# Patient Record
Sex: FEMALE | Race: WHITE | Employment: OTHER | URBAN - METROPOLITAN AREA
[De-identification: names, ages, dates, MRNs, and addresses within clinical notes are randomized per-mention and may not be internally consistent; named-entity substitution may affect disease eponyms.]

---

## 2017-07-21 PROBLEM — G45.1 TIA INVOLVING RIGHT INTERNAL CAROTID ARTERY: Status: ACTIVE | Noted: 2017-07-21

## 2017-11-01 PROBLEM — N18.30 CHRONIC RENAL IMPAIRMENT, STAGE 3 (MODERATE) (HCC): Status: ACTIVE | Noted: 2017-11-01

## 2017-12-01 PROBLEM — K85.90 PANCREATITIS, UNSPECIFIED PANCREATITIS TYPE: Status: ACTIVE | Noted: 2017-12-01

## 2017-12-07 PROBLEM — K81.9 CHOLECYSTITIS: Status: ACTIVE | Noted: 2017-12-07

## 2020-01-01 ENCOUNTER — TELEPHONE (OUTPATIENT)
Dept: PRIMARY CARE CLINIC | Age: 85
End: 2020-01-01

## 2020-01-01 ENCOUNTER — OFFICE VISIT (OUTPATIENT)
Dept: PRIMARY CARE CLINIC | Age: 85
End: 2020-01-01
Payer: MEDICARE

## 2020-01-01 VITALS
BODY MASS INDEX: 24.03 KG/M2 | OXYGEN SATURATION: 94 % | WEIGHT: 135.6 LBS | RESPIRATION RATE: 20 BRPM | DIASTOLIC BLOOD PRESSURE: 66 MMHG | SYSTOLIC BLOOD PRESSURE: 130 MMHG | HEIGHT: 63 IN | HEART RATE: 71 BPM | TEMPERATURE: 98.8 F

## 2020-01-01 VITALS
BODY MASS INDEX: 24.03 KG/M2 | RESPIRATION RATE: 20 BRPM | HEIGHT: 63 IN | SYSTOLIC BLOOD PRESSURE: 112 MMHG | WEIGHT: 135.6 LBS | DIASTOLIC BLOOD PRESSURE: 49 MMHG | TEMPERATURE: 99.5 F | HEART RATE: 61 BPM | OXYGEN SATURATION: 98 %

## 2020-01-01 VITALS
SYSTOLIC BLOOD PRESSURE: 126 MMHG | RESPIRATION RATE: 18 BRPM | OXYGEN SATURATION: 95 % | HEART RATE: 57 BPM | DIASTOLIC BLOOD PRESSURE: 68 MMHG | TEMPERATURE: 97 F

## 2020-01-01 VITALS
HEIGHT: 63 IN | SYSTOLIC BLOOD PRESSURE: 126 MMHG | OXYGEN SATURATION: 99 % | TEMPERATURE: 97 F | RESPIRATION RATE: 20 BRPM | BODY MASS INDEX: 24.27 KG/M2 | HEART RATE: 58 BPM | DIASTOLIC BLOOD PRESSURE: 68 MMHG

## 2020-01-01 VITALS
HEIGHT: 63 IN | DIASTOLIC BLOOD PRESSURE: 78 MMHG | WEIGHT: 137 LBS | SYSTOLIC BLOOD PRESSURE: 118 MMHG | HEART RATE: 55 BPM | OXYGEN SATURATION: 95 % | RESPIRATION RATE: 20 BRPM | BODY MASS INDEX: 24.27 KG/M2 | TEMPERATURE: 97.3 F

## 2020-01-01 VITALS
SYSTOLIC BLOOD PRESSURE: 126 MMHG | HEART RATE: 72 BPM | BODY MASS INDEX: 24.95 KG/M2 | TEMPERATURE: 97 F | HEIGHT: 63 IN | DIASTOLIC BLOOD PRESSURE: 68 MMHG | OXYGEN SATURATION: 97 % | RESPIRATION RATE: 20 BRPM | WEIGHT: 140.8 LBS

## 2020-01-01 LAB
ABSOLUTE BASO #: 0.1 10*3/UL (ref 0–0.1)
ABSOLUTE EOS #: 0.3 10*3/UL (ref 0–0.4)
ABSOLUTE NEUT #: 5.1 10*3/UL (ref 2.3–7.9)
ALBUMIN: 3.5 GM/DL (ref 3.1–4.5)
ALP BLD-CCNC: 120 U/L (ref 45–117)
ALT SERPL-CCNC: 19 U/L (ref 12–78)
AST SERPL-CCNC: 7 IU/L (ref 3–35)
BACTERIA, URINE: ABNORMAL
BASOPHILS %: 0.8 % (ref 0–1)
BILIRUB SERPL-MCNC: 0.5 MG/DL (ref 0.2–1)
BILIRUBIN: NEGATIVE
BLOOD: NEGATIVE
BUN BLDV-MCNC: 18 MG/DL (ref 7–24)
BUN BLDV-MCNC: 19 MG/DL (ref 7–24)
CALCIUM SERPL-MCNC: 8.7 MG/DL (ref 8.5–10.5)
CALCIUM SERPL-MCNC: 8.9 MG/DL (ref 8.5–10.5)
CHLORIDE BLD-SCNC: 92 MMOL/L (ref 98–107)
CHLORIDE BLD-SCNC: 96 MMOL/L (ref 98–107)
CLARITY: ABNORMAL
CLARITY: ABNORMAL
CLARITY: CLEAR
CO2: 30 MMOL/L (ref 21–32)
CO2: 32 MMOL/L (ref 21–32)
COLOR: YELLOW
CREAT SERPL-MCNC: 0.79 MG/DL (ref 0.55–1.02)
CREAT SERPL-MCNC: 0.9 MG/DL (ref 0.55–1.02)
EOSINOPHILS %: 4.5 % (ref 1–4)
EPITHELIAL CELLS, UA: ABNORMAL
GFR AFRICAN AMERICAN: > 60 ML/MIN
GFR AFRICAN AMERICAN: > 60 ML/MIN
GFR SERPL CREATININE-BSD FRML MDRD: 58 ML/MIN/
GFR SERPL CREATININE-BSD FRML MDRD: >60 ML/MIN/
GLUCOSE: 92 MG/DL (ref 65–99)
GLUCOSE: 94 MG/DL (ref 65–99)
GLUCOSE: NEGATIVE
HCT VFR BLD CALC: 34.6 % (ref 37–47)
HEMOGLOBIN: 11.8 G/DL (ref 12–16)
IMMATURE GRANULOCYTES #: 0 10*3/UL (ref 0–0.1)
IMMATURE GRANULOCYTES: 0.4 % (ref 0–1)
KETONES: ABNORMAL
KETONES: NEGATIVE
KETONES: NEGATIVE
LEUKOCYTE ESTERASE, URINE: ABNORMAL
LYMPHOCYTE %: 18.2 % (ref 27–41)
LYMPHOCYTES # BLD: 1.3 10*3/UL (ref 1.3–4.4)
MCH RBC QN AUTO: 31.1 PG (ref 27–31)
MCHC RBC AUTO-ENTMCNC: 34.1 G/DL (ref 33–37)
MCV RBC AUTO: 91.1 FL (ref 81–99)
MONOCYTES # BLD: 0.5 10*3/UL (ref 0.1–1)
MONOCYTES %: 6.4 % (ref 3–9)
NEUTROPHILS %: 69.7 % (ref 47–73)
NITRITE, URINE: NEGATIVE
NUCLEATED RED BLOOD CELLS: 0 % (ref 0–0)
PDW BLD-RTO: 12.5 % (ref 0–14.5)
PH, URINE: 5 (ref 4.5–8)
PH, URINE: 7 (ref 5–9)
PH, URINE: 7.5 (ref 4.5–8)
PLATELET # BLD: 263 10*3/UL (ref 130–400)
PMV BLD AUTO: 9.9 FL (ref 9.6–12.3)
POTASSIUM SERPL-SCNC: 3.3 MMOL/L (ref 3.5–5.1)
POTASSIUM SERPL-SCNC: 3.7 MMOL/L (ref 3.5–5.1)
PROTEIN UA: ABNORMAL
RBC # BLD: 3.8 10*6/UL (ref 4.1–5.1)
SODIUM BLD-SCNC: 127 MMOL/L (ref 136–145)
SODIUM BLD-SCNC: 132 MMOL/L (ref 136–145)
SPECIFIC GRAVITY UA: 1.01 (ref 1–1.03)
TOTAL PROTEIN: 6.5 GM/DL (ref 6.4–8.2)
TSH SERPL DL<=0.05 MIU/L-ACNC: 1.34 UIU/ML (ref 0.36–4.75)
URINE CULTURE, ROUTINE: ABNORMAL
URINE CULTURE, ROUTINE: NORMAL
URINE CULTURE, ROUTINE: NORMAL
UROBILINOGEN, URINE: 1 E.U./DL (ref 0.2–1)
UROBILINOGEN, URINE: 1 E.U./DL (ref 0–1)
UROBILINOGEN, URINE: 1 E.U./DL (ref 0–1)
WBC # BLD: 7.4 10*3/UL (ref 4.8–10.8)
WBC URINE: ABNORMAL WBC/HPF (ref 0–5)

## 2020-01-01 PROCEDURE — G0008 ADMIN INFLUENZA VIRUS VAC: HCPCS | Performed by: FAMILY MEDICINE

## 2020-01-01 PROCEDURE — 69210 REMOVE IMPACTED EAR WAX UNI: CPT | Performed by: FAMILY MEDICINE

## 2020-01-01 PROCEDURE — 90694 VACC AIIV4 NO PRSRV 0.5ML IM: CPT | Performed by: FAMILY MEDICINE

## 2020-01-01 RX ORDER — AMIODARONE HYDROCHLORIDE 200 MG/1
TABLET ORAL
Qty: 31 TABLET | Refills: 10 | Status: SHIPPED | OUTPATIENT
Start: 2020-01-01

## 2020-01-01 RX ORDER — ANASTROZOLE 1 MG/1
TABLET ORAL
Qty: 31 TABLET | Refills: 11 | Status: SHIPPED | OUTPATIENT
Start: 2020-01-01

## 2020-01-01 RX ORDER — SIMETHICONE 80 MG
80 TABLET,CHEWABLE ORAL EVERY 8 HOURS PRN
COMMUNITY
Start: 2018-12-27

## 2020-01-01 RX ORDER — CIPROFLOXACIN 250 MG/1
250 TABLET, FILM COATED ORAL 2 TIMES DAILY
Qty: 6 TABLET | Refills: 0 | Status: SHIPPED | OUTPATIENT
Start: 2020-01-01 | End: 2020-01-01

## 2020-01-01 RX ORDER — BISACODYL 10 MG
10 SUPPOSITORY, RECTAL RECTAL DAILY PRN
COMMUNITY
Start: 2020-02-21

## 2020-01-01 RX ORDER — POLYVINYL ALCOHOL 14 MG/ML
1 SOLUTION/ DROPS OPHTHALMIC 3 TIMES DAILY PRN
COMMUNITY
Start: 2015-08-14

## 2020-01-01 RX ORDER — AMLODIPINE BESYLATE 5 MG/1
TABLET ORAL
Qty: 31 TABLET | Refills: 10 | Status: ON HOLD
Start: 2020-01-01 | End: 2021-01-01 | Stop reason: SDUPTHER

## 2020-01-01 RX ORDER — AMOXICILLIN 250 MG
1 CAPSULE ORAL 2 TIMES DAILY
COMMUNITY
Start: 2020-05-12

## 2020-01-01 RX ORDER — OXYBUTYNIN CHLORIDE 5 MG/1
TABLET ORAL
Qty: 62 TABLET | Refills: 10 | Status: SHIPPED
Start: 2020-01-01 | End: 2020-01-01

## 2020-01-01 RX ORDER — HYDROCHLOROTHIAZIDE 25 MG/1
TABLET ORAL
Qty: 31 TABLET | Refills: 10 | Status: SHIPPED
Start: 2020-01-01 | End: 2021-01-01 | Stop reason: ALTCHOICE

## 2020-01-01 RX ORDER — SIMVASTATIN 20 MG
TABLET ORAL
Qty: 31 TABLET | Refills: 10 | Status: ON HOLD
Start: 2020-01-01 | End: 2021-01-01 | Stop reason: HOSPADM

## 2020-01-01 RX ORDER — TRAMADOL HYDROCHLORIDE 50 MG/1
50 TABLET ORAL 2 TIMES DAILY
COMMUNITY
End: 2020-01-01 | Stop reason: SDUPTHER

## 2020-01-01 RX ORDER — ANORECTAL OINTMENT 15.7; .44; 24; 20.6 G/100G; G/100G; G/100G; G/100G
1 OINTMENT TOPICAL 2 TIMES DAILY PRN
COMMUNITY
Start: 2019-07-22

## 2020-01-01 RX ORDER — LOSARTAN POTASSIUM 100 MG/1
TABLET ORAL
Qty: 31 TABLET | Refills: 11 | Status: ON HOLD
Start: 2020-01-01 | End: 2021-01-01 | Stop reason: HOSPADM

## 2020-01-01 RX ORDER — LORATADINE 10 MG/1
10 TABLET ORAL DAILY PRN
COMMUNITY
Start: 2018-05-29

## 2020-01-01 RX ORDER — CIPROFLOXACIN 250 MG/1
250 TABLET, FILM COATED ORAL 2 TIMES DAILY
Qty: 14 TABLET | Refills: 0 | Status: SHIPPED | OUTPATIENT
Start: 2020-01-01 | End: 2020-01-01

## 2020-01-01 RX ORDER — TRAMADOL HYDROCHLORIDE 50 MG/1
50 TABLET ORAL 2 TIMES DAILY
Qty: 60 TABLET | Refills: 5 | Status: SHIPPED
Start: 2020-01-01 | End: 2021-01-01 | Stop reason: SDUPTHER

## 2020-01-01 RX ORDER — CARBOXYMETHYLCELLULOSE SODIUM 5 MG/ML
1 SOLUTION/ DROPS OPHTHALMIC 3 TIMES DAILY
COMMUNITY
Start: 2020-03-27

## 2020-01-01 RX ORDER — A/SINGAPORE/GP1908/2015 IVR-180 (AN A/MICHIGAN/45/2015 (H1N1)PDM09-LIKE VIRUS, A/HONG KONG/4801/2014, NYMC X-263B (H3N2) (AN A/HONG KONG/4801/2014-LIKE VIRUS), AND B/BRISBANE/60/2008, WILD TYPE (A B/BRISBANE/60/2008-LIKE VIRUS) 15; 15; 15 UG/.5ML; UG/.5ML; UG/.5ML
0.5 INJECTION, SUSPENSION INTRAMUSCULAR ONCE
COMMUNITY
Start: 2020-01-01 | End: 2020-01-01

## 2020-01-01 RX ORDER — CLOPIDOGREL BISULFATE 75 MG/1
75 TABLET ORAL DAILY
Qty: 31 TABLET | Refills: 11 | Status: ON HOLD
Start: 2020-01-01 | End: 2021-01-01 | Stop reason: HOSPADM

## 2020-01-01 ASSESSMENT — PATIENT HEALTH QUESTIONNAIRE - PHQ9
SUM OF ALL RESPONSES TO PHQ9 QUESTIONS 1 & 2: 0
SUM OF ALL RESPONSES TO PHQ QUESTIONS 1-9: 0
2. FEELING DOWN, DEPRESSED OR HOPELESS: 0
1. LITTLE INTEREST OR PLEASURE IN DOING THINGS: 0
SUM OF ALL RESPONSES TO PHQ QUESTIONS 1-9: 0

## 2020-08-04 PROBLEM — K85.90 PANCREATITIS, UNSPECIFIED PANCREATITIS TYPE: Status: RESOLVED | Noted: 2017-12-01 | Resolved: 2020-01-01

## 2020-08-04 PROBLEM — K81.9 CHOLECYSTITIS: Status: RESOLVED | Noted: 2017-12-07 | Resolved: 2020-01-01

## 2020-08-04 NOTE — PROGRESS NOTES
2020    Home visit medically necessary in lieu of an office visit due to: [facility] resident, unable to walk, uses wheelchair, difficult to get out. HPI:  Jessica Landis (:  1924) is a 80 y.o. 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  Patient has lived at [address] [place] [address] for over 5 years. She was admitted to [facility] in  for a subdural hematoma, requiring a moreno hole. She had been on coumadin. She says she has been doing okay since then except for OAB. She feels she is better since being on oxybutynin. She also has problems with constipation but currently is doing well using Miralax. She is unable to walk due to weakness and uses a WC to get around. She takes tramadol twice a day for chronic DJD pain. Hearing is decreased. REVIEW OF SYSTEMS:    GENERAL: Appetite fair-good. No weight change, generally healthy, DECLINING strength AND exercise tolerance. SKIN:  No rash, itching, lesions, ecchymosis, erythema or ulcers. HEAD: No headaches, no lightheadedness, no injury. EYES: Poor vision, no diplopia, no tearing, no blind spots, no pain. EARS: HARD OF hearing, no tinnitus, no bleeding, no vertigo. NOSE: No epistaxis, no coryza, no obstruction, no discharge. MOUTH: No dental difficulties, no gingival bleeding, FULL dentures. NECK: No stiffness, no pain, no tenderness, no noted masses. CARDIOVASCULAR: No chest pains, no murmurs, no palpitations, no syncope, no orthopnea. SWELLING OF LEGS. RESPIRATORY: No shortness of breath, no pain with breathing, no wheezing, no hemoptysis, no cough, no respiratory infections, no TB, no fevers or night sweats. BREASTS:  No lumps, discharge or pain. GASTROINTESTINAL: No change in appetite, no dysphagia, no heartburn, no abdominal pains, no diarrhea, no bowel habit changes, no emesis, no melena, no hemorrhoids. STRUGGLES WITH CONSTIPATION. GENITOURINARY: No incontinence or retention, no nocturia, no frequent UTIs, no dysuria, no change in nature of urine. OAB, URGENCY. (Female) No post-menopausal bleeding, no discharge, no itching. MUSCULOSKELETAL: No swelling or redness of joints, no limitation of range of motion, no numbness. JOINT & MUSCLE PAIN AND WEAKNESS. NEURO/PSYCH: No tremor, no seizures, no changes in mentation, no ataxia. No depressive symptoms, no changes in sleep habits, no changes in thought content. MEMORY LOSS, CONFUSION. ALLERGIC/IMMUNOLOGIC/ENDOCRINE:  No reactions to drugs, foods or  insects. No anemia, no bleeding tendency, no transfusions.     Allergies   Allergen Reactions    Lopid [Gemfibrozil] Other (See Comments)     Leg weakness     Past Medical History:   Diagnosis Date    Arthritis     Atrial fibrillation ([address]     Chronic renal impairment, stage 3 (moderate) ([address] 11/1/2017    [place] (hard of hearing)     Hyperlipidemia     Hypertension     Irregular heartbeat     Malignant neoplasm of left female breast ([address] 1/11/2016    Osteoarthritis     Unspecified cerebral artery occlusion with cerebral infarction 2004    Valvular heart disease 4/28/2015    Visual disorder     double vision  (patient states she thinks it's her left eye)     Past Surgical History:   Procedure Laterality Date    BLADDER REPAIR      BREAST BIOPSY Right 1992    JOVI HOLE  6/16/15    CARDIOVERSION  11/24/2014    Dr. Magy Gold COLONOSCOPY  2004    EYE SURGERY Left 2011    cataract    HYSTERECTOMY  1994     Social History     Socioeconomic History    Marital status:     Number of children: 2 daughters, 1 son    Years of education: Not on file   Occupational History    Cook   Tobacco Use    Smoking status: Never Smoker    Smokeless tobacco: Never Used   Substance and Sexual Activity    Alcohol use: No    Drug use: No    Sexual activity: Not Currently   Lifestyle     Family History   Problem Relation Age of Onset    High Blood Pressure Mother     Cancer Mother 80        lung    Cancer Sister 80        colon     PHYSICAL EXAM:   Vitals:    08/04/20 0905   BP: (!) 112/49   Site: Right Wrist   Position: Sitting   Pulse: 61   Resp: 20   Temp: 99.5 °F (37.5 °C)   SpO2: 98%   Weight: 135 lb 9.6 oz (61.5 kg)   Height: 5' 3\" (1.6 m)     Estimated body mass index is 24.02 kg/m² as calculated from the following:    Height as of this encounter: 5' 3\" (1.6 m). Weight as of this encounter: 135 lb 9.6 oz (61.5 kg). GEN:  elderly WDWN female patient seated in recliner chair in NAD. HEAD:  atraumatic, normocephalic. EYES: EOMI, PERRL, no cataracts, conjunctivae appear normal.  ENT:   Poor hearing, bilateral EACs with cerumen impactions, TM's obscurred, nasal septum midline, no significant congestion, oral cavity without lesions, edentulous, full dentures. NECK:  fair-good ROM, no palpable masses, no carotid bruits, no JVD. LUNGS:  clear to ausc, no rales, rhonchi or wheezes. HEART:  RRR, no murmurs or gallops. ABD:  soft, non-tender to palp, no palp masses or HSM, normal BS. BACK:  no scoliosis or kyphosis, non-tender to palp. EXTREMITIES:  1+ pretibial edema, no ulcerations, varicosities or erythema. No gross deformities. MUSCULOSKELETAL:  Fair-good ROM of all joints, non tender to palp and or with movement. SKIN: No ulcerations or breakdown, rash, ecchymosis, or other lesions.  NEURO: No tremor, motor UEs 4-5/5 LEs 3-4/5, sensory normal, unable to stand or walk. PSYCH:  Pleasant and cooperative. Fluid slow speech, oriented to person, place and partial time. No delusional statements. Medications reviewed. 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     Diagnoses attached to this encounter:  Bethany Phillips was seen today for hypertension, atrial fibrillation, extremity weakness, constipation, incontinence and ear problem. Diagnoses and all orders for this visit:    Essential hypertension    Chronic renal impairment, stage 3 (moderate) ([facility])    Primary osteoarthritis involving multiple joints    History of moreno hole surgery 6/16/15    Malignant neoplasm of upper-outer quadrant of left female breast, unspecified estrogen receptor status (HCC)    OAB (overactive bladder)    Other constipation    Unable to walk    Senile debility    Bilateral hearing loss due to cerumen impaction       PLAN:  Check labs. Continue tramadol BID and as needed for pain. Cleaned bilateral ear wax impaction using [place] earloop. Tolerated well without pain. TM appears normal. Fall prevention discussed. Continue other meds as per Rx List. Recheck 1 month. 60 minutes spent on visit, 35 minutes involved education/counseling regarding cardiac, urinary, GI and ENT disease processes, treatment options, meds and coordination of care.      Current Outpatient Medications   Medication Sig Dispense Refill    17GRAM (1 CAPFUL) IN 4 TO 8OZ OF BEVERAGE AND TAKE BY MOUTH DAILY.) 116 g 1    metoprolol tartrate (LOPRESSOR) 25 MG tablet TAKE 1 TABLET BY MOUTH ONCE DAILY (Patient taking differently: Take 25 mg by mouth daily ) 90 tablet 1    Artificial Tear Ointment (REFRESH LACRI-LUBE) OINT ointment Apply 1 inch to eye nightly as needed       magnesium oxide (MAG-OX) 400 (240 MG) MG tablet take 1 tablet by mouth once daily (Patient taking differently: Take 400 mg by mouth daily take 1 tablet by mouth once daily) 30 tablet 3    docusate sodium (COLACE) 100 MG capsule Take 200 mg by mouth daily       calcium citrate-vitamin D (CITRICAL + D) 315-250 MG-UNIT TABS per tablet Take 2 tablets by mouth daily       aspirin 81 MG tablet Take 81 mg by mouth daily      magnesium hydroxide (MILK OF MAGNESIA) 400 MG/5ML suspension Take 30 mLs by mouth daily as needed for Constipation      acetaminophen (TYLENOL) 325 MG tablet Take 650 mg by mouth every 4 hours as needed for Pain or Fever       loperamide (IMODIUM) 2 MG capsule Take 2 mg by mouth 4 times daily as needed for Diarrhea       No current facility-administered medications for this visit. Return in about 1 month (around 9/4/2020). An  electronic signature was used to authenticate this note.     --Pauline Holguin MD on 8/4/2020 at 4:57 PM

## 2020-09-01 NOTE — PROGRESS NOTES
9/1/2020    Home visit medically necessary in lieu of an office visit due to: [facility] resident, unable to walk, uses wheelchair, difficult to get out. HPI:   Patient says she is doing better but thinks she has another UTI. She took a 3 day course of cipro about 2 weeks ago. She continues to have OAB but no dysuria. She has nocturia x 2. She is moving her bowels well using Miralax. She is unable to walk due to weakness and uses a WC to get around. She takes tramadol twice a day for chronic DJD pain and is not c/o pain. REVIEW OF SYSTEMS:    GENERAL: Appetite fair-good. No weight change, generally healthy, DECLINING strength AND exercise tolerance. CARDIOVASCULAR: No chest pains, no murmurs, no palpitations, no syncope, no orthopnea. SWELLING OF LEGS. RESPIRATORY: No shortness of breath, no pain with breathing, no wheezing, no hemoptysis, no cough. GASTROINTESTINAL: No change in appetite, no dysphagia, no heartburn, no abdominal pains, no diarrhea, no bowel habit changes, no emesis, no melena, no hemorrhoids. STRUGGLES WITH CONSTIPATION. GENITOURINARY: No incontinence or retention, no nocturia, no frequent UTIs, no dysuria, no change in nature of urine. OAB, URGENCY. MUSCULOSKELETAL: No swelling or redness of joints, no limitation of range of motion, no numbness. JOINT & MUSCLE PAIN AND WEAKNESS. NEURO/PSYCH: No tremor, no seizures, no changes in mentation, no ataxia. No depressive symptoms, no changes in sleep habits, no changes in thought content. MEMORY LOSS, CONFUSION. All other systems negative.     Allergies   Allergen Reactions    Lopid [Gemfibrozil] Other (See Comments)     Leg weakness     Past Medical History:   Diagnosis Date    Arthritis     Atrial fibrillation ([address]     Chronic renal impairment, stage 3 (moderate) ([address] 11/1/2017    [place] (hard of hearing)     Hyperlipidemia     Hypertension     Irregular heartbeat     Malignant neoplasm of left female breast ([address] 1/11/2016    Osteoarthritis     Unspecified cerebral artery occlusion with cerebral infarction 2004    Valvular heart disease 4/28/2015    Visual disorder     double vision  (patient states she thinks it's her left eye)     Past Surgical History:   Procedure Laterality Date    BLADDER REPAIR      BREAST BIOPSY Right 1992    JOVI HOLE  6/16/15    CARDIOVERSION  11/24/2014    Dr. Tarun Theodore  2004    EYE SURGERY Left 2011    cataract   [id number]     Social History     Socioeconomic History    Marital status:     Number of children: 2 daughters, 1 son    Years of education: Not on file   Occupational History    Cook   Tobacco Use    Smoking status: Never Smoker    Smokeless tobacco: Never Used   Substance and Sexual Activity    Alcohol use: No    Drug use: No    Sexual activity: Not Currently   Lifestyle     Family History   Problem Relation Age of Onset    High Blood Pressure Mother     Cancer Mother 80        lung    Cancer Sister 80        colon     PHYSICAL EXAM:   Vitals:    09/01/20 1116   BP: 130/66   Site: Left Wrist   Position: Sitting   Pulse: 71   Resp: 20   Temp: 98.8 °F (37.1 °C)   TempSrc: Infrared   SpO2: 94%   Weight: 135 lb 9.6 oz (61.5 kg)   Height: 5' 3\" (1.6 m)     Estimated body mass index is 24.02 kg/m² as calculated from the following:    Height as of this encounter: 5' 3\" (1.6 m). Weight as of this encounter: 135 lb 9.6 oz (61.5 kg). GEN:  elderly WDWN female patient seated in recliner chair in NAD. HEAD:  atraumatic, normocephalic. EYES: EOMI, PERRL, no cataracts, conjunctivae appear normal.  ENT:  Poor hearing, bilateral EACs with cerumen impactions, TM's obscurred, nasal septum midline, no significant congestion, oral cavity without lesions, edentulous, full dentures. NECK:  fair-good ROM, no palpable masses, no carotid bruits, no JVD. LUNGS:  clear to ausc, no rales, rhonchi or wheezes. HEART:  RRR, no murmurs or gallops.    ABD:  soft, non-tender to palp, no palp masses or HSM, normal BS. BACK:  no scoliosis or kyphosis, non-tender to palp. EXTREMITIES:  Trace-1+ pretibial edema, no ulcerations, varicosities or erythema. No gross deformities. MUSCULOSKELETAL:  Fair-good ROM of all joints, non tender to palp and or with movement. SKIN: No ulcerations or breakdown, rash, ecchymosis, or other lesions. NEURO: No tremor, motor UEs 4-5/5 LEs 3-4/5, sensory normal, unable to stand or walk. PSYCH:  Pleasant and cooperative. Fluid slow speech, oriented to person, place and partial time. No delusional statements. Medications reviewed. 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     Diagnoses attached to this encounter:  Enoc Kendrick was seen today for hypertension and urinary frequency. Diagnoses and all orders for this visit:    Chronic atrial fibrillation    OAB (overactive bladder)    Essential hypertension    Primary osteoarthritis involving multiple joints    Chronic renal impairment, stage 3 (moderate) (HCC)    Senile debility    Unable to walk    Other orders  -     mirabegron (MYRBETRIQ) 25 MG TB24; Take 1 tablet by mouth nightly       PLAN:  D/C oxybutynin. Switch to Myrbetriq 25mg q HS. Continue tramadol BID and as needed for pain. Fall prevention discussed. Continue other meds as per Rx List. Recheck 1 month.  40 minutes spent on visit, 25 minutes involved education/counseling regarding cardiac, urinary, GI and ENT disease processes, treatment options, meds and coordination of care. Current Outpatient Medications   Medication Sig Dispense Refill    mirabegron (MYRBETRIQ) 25 MG TB24 Take 1 tablet by mouth nightly 30 tablet 11    clopidogrel (PLAVIX) 75 MG tablet Take 1 tablet by mouth daily 31 tablet 11    traMADol (ULTRAM) 50 MG tablet Take 1 tablet by mouth 2 times daily for 180 days. 60 tablet 5    hydroCHLOROthiazide (HYDRODIURIL) 25 MG tablet TAKE ONE(1) TABLET BY MOUTH DAILY 31 tablet 10    amLODIPine (NORVASC) 5 MG tablet TAKE ONE(1) TABLET BY MOUTH DAILY 31 tablet 10    amiodarone (CORDARONE) 200 MG tablet TAKE ONE(1) TABLET BY MOUTH ONCE DAILY 31 tablet 10    simvastatin (ZOCOR) 20 MG tablet TAKE ONE TABLET BY MOUTH DAILY 31 tablet 10    losartan (COZAAR) 100 MG tablet TAKE ONE TABLET BY MOUTH DAILY 31 tablet 11    anastrozole (ARIMIDEX) 1 MG tablet TAKE ONE TABLET BY MOUTH DAILY **WOMEN OF CHILD-BEARING AGE SHOULD NOT HANDLE BROKEN OR CRUSHED TABLETS** 31 tablet 11    menthol-zinc oxide (CALMOSEPTINE) 0.44-20.6 % OINT ointment Apply 1 each topically 2 times daily as needed Max 30 ml per day.       Dentifrices (BIOTENE DRY MOUTH CARE DT) Place 1 Container onto teeth 4 times daily as needed      bisacodyl (DULCOLAX) 10 MG suppository Place 10 mg rectally daily as needed for Constipation      senna-docusate (SENEXON-S) 8.6-50 MG per tablet Take 1 tablet by mouth 2 times daily      loratadine (CLARITIN) 10 MG tablet Take 10 mg by mouth daily as needed (allergy sxms)      simethicone (MI-ACID GAS RELIEF) 80 MG chewable tablet Take 80 mg by mouth every 8 hours as needed for Flatulence      carboxymethylcellulose PF (REFRESH PLUS) 0.5 % SOLN ophthalmic solution 1 drop 3 times daily      polyvinyl alcohol (ARTIFICIAL TEARS) 1.4 % ophthalmic solution Place 1 drop into both eyes 3 times daily as needed for Dry Eyes  polyethylene glycol (GLYCOLAX) 17 GM/SCOOP powder DISSOLVE 17GRAM (1 CAPFUL) IN 4 TO 8OZ OF BEVERAGE AND TAKE BY MOUTH DAILY. (Patient taking differently: Take 17 g by mouth daily DISSOLVE 17GRAM (1 CAPFUL) IN 4 TO 8OZ OF BEVERAGE AND TAKE BY MOUTH DAILY.) 116 g 1    metoprolol tartrate (LOPRESSOR) 25 MG tablet TAKE 1 TABLET BY MOUTH ONCE DAILY (Patient taking differently: Take 25 mg by mouth daily ) 90 tablet 1    Artificial Tear Ointment (REFRESH LACRI-LUBE) OINT ointment Apply 1 inch to eye nightly as needed       magnesium oxide (MAG-OX) 400 (240 MG) MG tablet take 1 tablet by mouth once daily (Patient taking differently: Take 400 mg by mouth daily take 1 tablet by mouth once daily) 30 tablet 3    docusate sodium (COLACE) 100 MG capsule Take 200 mg by mouth daily       calcium citrate-vitamin D (CITRICAL + D) 315-250 MG-UNIT TABS per tablet Take 2 tablets by mouth daily       aspirin 81 MG tablet Take 81 mg by mouth daily      magnesium hydroxide (MILK OF MAGNESIA) 400 MG/5ML suspension Take 30 mLs by mouth daily as needed for Constipation      acetaminophen (TYLENOL) 325 MG tablet Take 650 mg by mouth every 4 hours as needed for Pain or Fever       loperamide (IMODIUM) 2 MG capsule Take 2 mg by mouth 4 times daily as needed for Diarrhea       No current facility-administered medications for this visit. Return in about 1 month (around 10/1/2020). An  electronic signature was used to authenticate this note.     --Anival Boyd MD on 9/1/2020 at 12:11 PM

## 2020-09-28 NOTE — PROGRESS NOTES
9/29/2020    Home visit medically necessary in lieu of an office visit due to: [place] resident, unable to walk, uses wheelchair, difficult to get out. HPI:   Patient says she is doing better with OAB on Myrbetriq. Some nights she does not even wake up. She is moving her bowels well using Miralax. She says her appetite is good. She is unable to walk due to weakness and uses a WC to get around. She needs help to stand and transfer but has not had any falls. She takes tramadol twice a day for chronic DJD pain and is not c/o pain. REVIEW OF SYSTEMS:    GENERAL: Appetite fair-good. No weight change, generally healthy, DECLINING strength AND exercise tolerance. CARDIOVASCULAR: No chest pains, no murmurs, no palpitations, no syncope, no orthopnea. SWELLING OF LEGS. RESPIRATORY: No shortness of breath, no pain with breathing, no wheezing, no hemoptysis, no cough. GASTROINTESTINAL: No change in appetite, no dysphagia, no heartburn, no abdominal pains, no diarrhea, no bowel habit changes, no emesis, no melena, no hemorrhoids. STRUGGLES WITH CONSTIPATION. GENITOURINARY: No incontinence or retention, no nocturia, no frequent UTIs, no dysuria, no change in nature of urine. OAB, URGENCY. MUSCULOSKELETAL: No swelling or redness of joints, no limitation of range of motion, no numbness. JOINT & MUSCLE PAIN AND WEAKNESS. NEURO/PSYCH: No tremor, no seizures, no changes in mentation, no ataxia. No depressive symptoms, no changes in sleep habits, no changes in thought content. MEMORY LOSS, CONFUSION. All other systems negative.     Allergies   Allergen Reactions    Lopid [Gemfibrozil] Other (See Comments)     Leg weakness     Past Medical History:   Diagnosis Date    Arthritis     Atrial fibrillation ([address]     Chronic renal impairment, stage 3 (moderate) ([address] 11/1/2017    [place] (hard of hearing)     Hyperlipidemia     Hypertension     Irregular heartbeat     Malignant neoplasm of left female breast ([address] 1/11/2016    Osteoarthritis     Unspecified cerebral artery occlusion with cerebral infarction 2004    Valvular heart disease 4/28/2015    Visual disorder     double vision  (patient states she thinks it's her left eye)     Past Surgical History:   Procedure Laterality Date    BLADDER REPAIR      BREAST BIOPSY Right 1992    JOVI HOLE  6/16/15    CARDIOVERSION  11/24/2014    Dr. Elzbieta Vaughn  2004    EYE SURGERY Left 2011    cataract   [facility]     Social History     Socioeconomic History    Marital status:     Number of children: 2 daughters, 1 son    Years of education: Not on file   Occupational History    Cook   Tobacco Use    Smoking status: Never Smoker    Smokeless tobacco: Never Used   Substance and Sexual Activity    Alcohol use: No    Drug use: No    Sexual activity: Not Currently   Lifestyle     Family History   Problem Relation Age of Onset    High Blood Pressure Mother     Cancer Mother 80        lung    Cancer Sister 80        colon     PHYSICAL EXAM:   Vitals:    09/29/20 0954   BP: 118/78   Site: Left Wrist   Position: Sitting   Pulse: 55   Resp: 20   Temp: 97.3 °F (36.3 °C)   TempSrc: Infrared   SpO2: 95%   Weight: 137 lb (62.1 kg)   Height: 5' 3\" (1.6 m)     Estimated body mass index is 24.27 kg/m² as calculated from the following:    Height as of this encounter: 5' 3\" (1.6 m). Weight as of this encounter: 137 lb (62.1 kg). GEN:  elderly WDWN female patient seated in recliner chair in NAD. HEAD:  atraumatic, normocephalic. EYES: EOMI, PERRL, no cataracts, conjunctivae appear normal.  ENT:  Poor hearing, bilateral EACs with cerumen impactions, TM's obscurred, nasal septum midline, no significant congestion, oral cavity without lesions, edentulous, full dentures. NECK:  fair-good ROM, no palpable masses, no carotid bruits, no JVD. LUNGS:  clear to ausc, no rales, rhonchi or wheezes. HEART:  RRR, no murmurs or gallops.    ABD:  soft, non-tender to palp, no palp masses or HSM, normal BS. BACK:  no scoliosis or kyphosis, non-tender to palp. EXTREMITIES:  Trace-1+ pretibial edema, no ulcerations, varicosities or erythema. No gross deformities. MUSCULOSKELETAL:  Fair-good ROM of all joints, non tender to palp and or with movement. SKIN: No ulcerations or breakdown, rash, ecchymosis, or other lesions. NEURO: No tremor, motor UEs 4-5/5 LEs 3-4/5, sensory normal, unable to stand or walk. PSYCH:  Pleasant and cooperative. Fluid slow speech, oriented to person, place and partial time. No delusional statements. Medications reviewed. 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     Diagnoses attached to this encounter:  Veronica Calzada was seen today for atrial fibrillation and hypertension. Diagnoses and all orders for this visit:    Chronic atrial fibrillation    Essential hypertension  -     Basic Metabolic Panel; Future    Primary osteoarthritis involving multiple joints    Chronic renal impairment, stage 3 (moderate) (HCC)    Senile debility    Unable to walk       PLAN:  Check BMP. Continue Myrbetriq 25mg q HS. Continue tramadol BID and as needed for pain. Fall prevention discussed. Check BMP every 3 months, next in December. Continue other meds as per Rx List. Recheck 1 month.  40 minutes spent on visit, 25 minutes involved education/counseling regarding cardiac, urinary, GI and ENT disease processes, treatment options, meds and coordination of care. Current Outpatient Medications   Medication Sig Dispense Refill    polyethylene glycol (GLYCOLAX) 17 GM/SCOOP powder DISSOLVE 17GRAM (1 CAPFUL) IN 4 TO 8OZ OF BEVERAGE AND TAKE BY MOUTH DAILY. **RE-ORDER ACCORDINGLY** 510 g 11    metoprolol tartrate (LOPRESSOR) 25 MG tablet TAKE 1 TABLET BY MOUTH ONCE DAILY 31 tablet 11    mirabegron (MYRBETRIQ) 25 MG TB24 Take 1 tablet by mouth nightly 30 tablet 11    clopidogrel (PLAVIX) 75 MG tablet Take 1 tablet by mouth daily 31 tablet 11    traMADol (ULTRAM) 50 MG tablet Take 1 tablet by mouth 2 times daily for 180 days. 60 tablet 5    hydroCHLOROthiazide (HYDRODIURIL) 25 MG tablet TAKE ONE(1) TABLET BY MOUTH DAILY 31 tablet 10    amLODIPine (NORVASC) 5 MG tablet TAKE ONE(1) TABLET BY MOUTH DAILY 31 tablet 10    amiodarone (CORDARONE) 200 MG tablet TAKE ONE(1) TABLET BY MOUTH ONCE DAILY 31 tablet 10    simvastatin (ZOCOR) 20 MG tablet TAKE ONE TABLET BY MOUTH DAILY 31 tablet 10    losartan (COZAAR) 100 MG tablet TAKE ONE TABLET BY MOUTH DAILY 31 tablet 11    anastrozole (ARIMIDEX) 1 MG tablet TAKE ONE TABLET BY MOUTH DAILY **WOMEN OF CHILD-BEARING AGE SHOULD NOT HANDLE BROKEN OR CRUSHED TABLETS** 31 tablet 11    menthol-zinc oxide (CALMOSEPTINE) 0.44-20.6 % OINT ointment Apply 1 each topically 2 times daily as needed Max 30 ml per day.       Dentifrices (BIOTENE DRY MOUTH CARE DT) Place 1 Container onto teeth 4 times daily as needed      bisacodyl (DULCOLAX) 10 MG suppository Place 10 mg rectally daily as needed for Constipation      senna-docusate (SENEXON-S) 8.6-50 MG per tablet Take 1 tablet by mouth 2 times daily      loratadine (CLARITIN) 10 MG tablet Take 10 mg by mouth daily as needed (allergy sxms)      simethicone (MI-ACID GAS RELIEF) 80 MG chewable tablet Take 80 mg by mouth every 8 hours as needed for Flatulence      carboxymethylcellulose PF (REFRESH PLUS) 0.5 % SOLN ophthalmic solution 1 drop 3 times daily      polyvinyl alcohol (ARTIFICIAL TEARS) 1.4 % ophthalmic solution Place 1 drop into both eyes 3 times daily as needed for Dry Eyes      Artificial Tear Ointment (REFRESH LACRI-LUBE) OINT ointment Apply 1 inch to eye nightly as needed       magnesium oxide (MAG-OX) 400 (240 MG) MG tablet take 1 tablet by mouth once daily (Patient taking differently: Take 400 mg by mouth daily take 1 tablet by mouth once daily) 30 tablet 3    docusate sodium (COLACE) 100 MG capsule Take 200 mg by mouth daily       calcium citrate-vitamin D (CITRICAL + D) 315-250 MG-UNIT TABS per tablet Take 2 tablets by mouth daily       aspirin 81 MG tablet Take 81 mg by mouth daily      magnesium hydroxide (MILK OF MAGNESIA) 400 MG/5ML suspension Take 30 mLs by mouth daily as needed for Constipation      acetaminophen (TYLENOL) 325 MG tablet Take 650 mg by mouth every 4 hours as needed for Pain or Fever       loperamide (IMODIUM) 2 MG capsule Take 2 mg by mouth 4 times daily as needed for Diarrhea       No current facility-administered medications for this visit. Return in about 1 month (around 10/29/2020). An  electronic signature was used to authenticate this note.     --Anival Boyd MD on 9/29/2020 at 10:41 AM

## 2020-10-28 PROBLEM — N18.30 CHRONIC RENAL IMPAIRMENT, STAGE 3 (MODERATE) (HCC): Status: RESOLVED | Noted: 2017-11-01 | Resolved: 2020-01-01

## 2020-10-28 NOTE — PROGRESS NOTES
10/28/2020    Home visit medically necessary in lieu of an office visit due to: [place] resident, unable to walk, uses wheelchair, difficult to get out. HPI:   Patient says she is doing okay with OAB on Myrbetriq. She usually get up at least once/night. She is moving her bowels well using Miralax. Her appetite is good. She is unable to walk due to weakness and uses a WC to get around. She needs help to stand and transfer. She has not had any falls this month. She continues on tramadol twice a day for chronic DJD pain and is not c/o pain. REVIEW OF SYSTEMS:    GENERAL: Appetite fair-good. No weight change, generally healthy, DECLINING strength AND exercise tolerance. CARDIOVASCULAR: No chest pains, no murmurs, no palpitations, no syncope, no orthopnea. SWELLING OF LEGS. RESPIRATORY: No shortness of breath, no pain with breathing, no wheezing, no hemoptysis, no cough. GASTROINTESTINAL: No change in appetite, no dysphagia, no heartburn, no abdominal pains, no diarrhea, no bowel habit changes, no emesis, no melena, no hemorrhoids. STRUGGLES WITH CONSTIPATION. GENITOURINARY: No incontinence or retention, no nocturia, no frequent UTIs, no dysuria, no change in nature of urine. OAB, URGENCY. MUSCULOSKELETAL: No swelling or redness of joints, no limitation of range of motion, no numbness. JOINT & MUSCLE PAIN AND WEAKNESS. NEURO/PSYCH: No tremor, no seizures, no changes in mentation, no ataxia. No depressive symptoms, no changes in sleep habits, no changes in thought content. MEMORY LOSS, CONFUSION. All other systems negative.     Allergies   Allergen Reactions    Lopid [Gemfibrozil] Other (See Comments)     Leg weakness     Past Medical History:   Diagnosis Date    Arthritis     Atrial fibrillation ([address]     Chronic renal impairment, stage 3 (moderate) 11/1/2017    [place] (hard of hearing)     Hyperlipidemia     Hypertension     Irregular heartbeat     Malignant neoplasm of left female breast ([address] 1/11/2016    Osteoarthritis     Unspecified cerebral artery occlusion with cerebral infarction 2004    Valvular heart disease 4/28/2015    Visual disorder     double vision  (patient states she thinks it's her left eye)     Past Surgical History:   Procedure Laterality Date    BLADDER REPAIR      BREAST BIOPSY Right 1992    JOVI HOLE  6/16/15    CARDIOVERSION  11/24/2014    Dr. Cristine Díaz  2004    EYE SURGERY Left 2011    cataract   [address]     Social History     Socioeconomic History    Marital status:     Number of children: 2 daughters, 1 son    Years of education: Not on file   Occupational History    Cook   Tobacco Use    Smoking status: Never Smoker    Smokeless tobacco: Never Used   Substance and Sexual Activity    Alcohol use: No    Drug use: No    Sexual activity: Not Currently   Lifestyle     Family History   Problem Relation Age of Onset    High Blood Pressure Mother     Cancer Mother 80        lung    Cancer Sister 80        colon     PHYSICAL EXAM:   Vitals:    10/28/20 0907   BP: 126/68   Site: Right Upper Arm   Position: Sitting   Pulse: 58   Resp: 20   Temp: 97 °F (36.1 °C)   TempSrc: Infrared   SpO2: 99%   Weight: Comment: unable   Height: 5' 3\" (1.6 m)     Estimated body mass index is 24.27 kg/m² as calculated from the following:    Height as of this encounter: 5' 3\" (1.6 m). Weight as of 9/29/20: 137 lb (62.1 kg). GEN:  elderly WDWN female patient seated in recliner chair in NAD. HEAD:  atraumatic, normocephalic. EYES: EOMI, PERRL, no cataracts, conjunctivae appear normal.  ENT:  Poor hearing, bilateral EACs with cerumen impactions, TM's obscurred, nasal septum midline, no significant congestion, oral cavity without lesions, edentulous, full dentures. NECK:  fair-good ROM, no palpable masses, no carotid bruits, no JVD. LUNGS:  clear to ausc, no rales, rhonchi or wheezes. HEART:  RRR, no murmurs or gallops.    ABD:  soft, non-tender to palp, no palp masses or HSM, normal BS. BACK:  no scoliosis or kyphosis, non-tender to palp. EXTREMITIES:  Trace-1+ pretibial edema, no ulcerations, varicosities or erythema. No gross deformities. MUSCULOSKELETAL:  Fair-good ROM of all joints, non tender to palp and or with movement. SKIN: No ulcerations or breakdown, rash, ecchymosis, or other lesions. NEURO: No tremor, motor UEs 4-5/5 LEs 3-4/5, sensory normal, unable to stand or walk. PSYCH:  Pleasant and cooperative. Fluid slow speech, oriented to person, place and partial time. No delusional statements. Medications reviewed. 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     Diagnoses attached to this encounter:  Rosa Carty was seen today for hypertension, atrial fibrillation and immunizations. Diagnoses and all orders for this visit:    Chronic atrial fibrillation ([address]    Essential hypertension    Other constipation    Primary osteoarthritis involving multiple joints    Senile debility    Unable to walk    OAB (overactive bladder)    Need for immunization against influenza    Other orders  -     INFLUENZA, QUADV, ADJUVANTED, 65 YRS =, IM, PF, PREFILL SYR, 0.5ML (FLUAD)       PLAN:  Flu vac given L deltoid. Continue Myrbetriq 25mg q HS. Continue tramadol BID and as needed for pain. Fall prevention discussed. Check BMP every 3 months, next in December.  Continue other meds as per Rx List. Recheck 1 month. 40 minutes spent on visit, 25 minutes involved education/counseling regarding cardiac, urinary, GI and ENT disease processes, treatment options, meds and coordination of care. Current Outpatient Medications   Medication Sig Dispense Refill    influenza A&B vaccine (FLUAD QUADRIVALENT) 0.5 ML PRSY injection Inject 0.5 mLs into the muscle once Given L deltoid. [address] [id number]; XC [id number]; exp 06/30/2021      polyethylene glycol (GLYCOLAX) 17 GM/SCOOP powder DISSOLVE 17GRAM (1 CAPFUL) IN 4 TO 8OZ OF BEVERAGE AND TAKE BY MOUTH DAILY. **RE-ORDER ACCORDINGLY** 510 g 11    metoprolol tartrate (LOPRESSOR) 25 MG tablet TAKE 1 TABLET BY MOUTH ONCE DAILY 31 tablet 11    mirabegron (MYRBETRIQ) 25 MG TB24 Take 1 tablet by mouth nightly 30 tablet 11    clopidogrel (PLAVIX) 75 MG tablet Take 1 tablet by mouth daily 31 tablet 11    traMADol (ULTRAM) 50 MG tablet Take 1 tablet by mouth 2 times daily for 180 days. 60 tablet 5    hydroCHLOROthiazide (HYDRODIURIL) 25 MG tablet TAKE ONE(1) TABLET BY MOUTH DAILY 31 tablet 10    amLODIPine (NORVASC) 5 MG tablet TAKE ONE(1) TABLET BY MOUTH DAILY 31 tablet 10    amiodarone (CORDARONE) 200 MG tablet TAKE ONE(1) TABLET BY MOUTH ONCE DAILY 31 tablet 10    simvastatin (ZOCOR) 20 MG tablet TAKE ONE TABLET BY MOUTH DAILY 31 tablet 10    losartan (COZAAR) 100 MG tablet TAKE ONE TABLET BY MOUTH DAILY 31 tablet 11    anastrozole (ARIMIDEX) 1 MG tablet TAKE ONE TABLET BY MOUTH DAILY **WOMEN OF CHILD-BEARING AGE SHOULD NOT HANDLE BROKEN OR CRUSHED TABLETS** 31 tablet 11    menthol-zinc oxide (CALMOSEPTINE) 0.44-20.6 % OINT ointment Apply 1 each topically 2 times daily as needed Max 30 ml per day.       Dentifrices (BIOTENE DRY MOUTH CARE DT) Place 1 Container onto teeth 4 times daily as needed      bisacodyl (DULCOLAX) 10 MG suppository Place 10 mg rectally daily as needed for Constipation      senna-docusate (SENEXON-S) 8.6-50 MG per tablet Take 1 tablet by mouth 2 times daily      loratadine (CLARITIN) 10 MG tablet Take 10 mg by mouth daily as needed (allergy sxms)      simethicone (MI-ACID GAS RELIEF) 80 MG chewable tablet Take 80 mg by mouth every 8 hours as needed for Flatulence      carboxymethylcellulose PF (REFRESH PLUS) 0.5 % SOLN ophthalmic solution 1 drop 3 times daily      polyvinyl alcohol (ARTIFICIAL TEARS) 1.4 % ophthalmic solution Place 1 drop into both eyes 3 times daily as needed for Dry Eyes      Artificial Tear Ointment (REFRESH LACRI-LUBE) OINT ointment Apply 1 inch to eye nightly as needed       magnesium oxide (MAG-OX) 400 (240 MG) MG tablet take 1 tablet by mouth once daily (Patient taking differently: Take 400 mg by mouth daily take 1 tablet by mouth once daily) 30 tablet 3    docusate sodium (COLACE) 100 MG capsule Take 200 mg by mouth daily       calcium citrate-vitamin D (CITRICAL + D) 315-250 MG-UNIT TABS per tablet Take 2 tablets by mouth daily       aspirin 81 MG tablet Take 81 mg by mouth daily      magnesium hydroxide (MILK OF MAGNESIA) 400 MG/5ML suspension Take 30 mLs by mouth daily as needed for Constipation      acetaminophen (TYLENOL) 325 MG tablet Take 650 mg by mouth every 4 hours as needed for Pain or Fever       loperamide (IMODIUM) 2 MG capsule Take 2 mg by mouth 4 times daily as needed for Diarrhea       No current facility-administered medications for this visit. Return in about 1 month (around 11/28/2020). An  electronic signature was used to authenticate this note.     --Hipolito Najera MD on 10/28/2020 at 2:30 PM

## 2020-11-19 NOTE — TELEPHONE ENCOUNTER
I switched her to [facility] on 9/1 because she was c/o OAB on oxybutynin. The last couple visits she thought she was doing better. Yes, they can do a UA C&S. We can consider and talk about whether a veronica would be a good idea. Presently I don't think it is medically indicated.

## 2020-11-19 NOTE — TELEPHONE ENCOUNTER
Fax from [facility]. Xu Rebolledo' dtr called facility and spoke with our  about a few things. She would like to know if Xu Rebolledo can be retested for UTI? She wants to know why Xu Rebolledo was switched to [employer]. Also, dtr was inquiring about her having a veronica.

## 2020-11-20 NOTE — TELEPHONE ENCOUNTER
Send order - Cipro 250 mg BID x 7 days (Since it's Friday, I'm going to start her on ATB pending culture results).

## 2020-11-25 NOTE — PROGRESS NOTES
11/25/2020    Home visit medically necessary in lieu of an office visit due to: [place] resident, unable to walk, uses wheelchair, difficult to get out. HPI:   Patient says she was having increased urination and dysuria but last night, after being on cipro for 5 days, she only got up once. She is no longer having dysuria. She is on Myrbetriq. She usually get up at least once/night. She was leaking quite a bit before but is better now. She is moving her bowels once or twice a week, using Miralax. Her appetite is good. She is unable to walk due to weakness and uses a WC to get around. She needs help to stand and transfer. She has not had any falls this month. She continues on tramadol twice a day for chronic DJD pain and is not c/o pain except for stiffness in her hands. REVIEW OF SYSTEMS:    GENERAL: Appetite fair-good. No weight change, generally healthy, DECLINING strength AND exercise tolerance. CARDIOVASCULAR: No chest pains, no murmurs, no palpitations, no syncope, no orthopnea. SWELLING OF LEGS. RESPIRATORY: No shortness of breath, no pain with breathing, no wheezing, no hemoptysis, no cough. GASTROINTESTINAL: No change in appetite, no dysphagia, no heartburn, no abdominal pains, no diarrhea, no bowel habit changes, no emesis, no melena, no hemorrhoids. STRUGGLES WITH CONSTIPATION. GENITOURINARY: No incontinence or retention, no nocturia, no frequent UTIs, no dysuria, no change in nature of urine. OAB, URGENCY. MUSCULOSKELETAL: No swelling or redness of joints, no limitation of range of motion, no numbness. JOINT & MUSCLE PAIN AND WEAKNESS. NEURO/PSYCH: No tremor, no seizures, no changes in mentation, no ataxia. No depressive symptoms, no changes in sleep habits, no changes in thought content. MEMORY LOSS, CONFUSION. All other systems negative.     Allergies   Allergen Reactions    Lopid [Gemfibrozil] Other (See Comments)     Leg weakness     Past Medical History:   Diagnosis Date    Arthritis     Atrial fibrillation ([facility] [address]     Chronic renal impairment, stage 3 (moderate) 11/1/2017    [place] (hard of hearing)     Hyperlipidemia     Hypertension     Irregular heartbeat     Malignant neoplasm of left female breast ([facility] [address] 1/11/2016    Osteoarthritis     Unspecified cerebral artery occlusion with cerebral infarction 2004    Valvular heart disease 4/28/2015    Visual disorder     double vision  (patient states she thinks it's her left eye)     Past Surgical History:   Procedure Laterality Date    BLADDER REPAIR      BREAST BIOPSY Right 1992    JOVI HOLE  6/16/15    CARDIOVERSION  11/24/2014    Dr. Fely Severino  2004    EYE SURGERY Left 2011    cataract   [address]     Social History     Socioeconomic History    Marital status:     Number of children: 2 daughters, 1 son    Years of education: Not on file   Occupational History    Cook   Tobacco Use    Smoking status: Never Smoker    Smokeless tobacco: Never Used   Substance and Sexual Activity    Alcohol use: No    Drug use: No    Sexual activity: Not Currently   Lifestyle     Family History   Problem Relation Age of Onset    High Blood Pressure Mother     Cancer Mother 80        lung    Cancer Sister 80        colon     PHYSICAL EXAM:   Vitals:    11/25/20 0851   BP: 126/68   Site: Right Upper Arm   Position: Sitting   Pulse: 72   Resp: 20   Temp: 97 °F (36.1 °C)   TempSrc: Temporal   SpO2: 97%   Weight: 140 lb 12.8 oz (63.9 kg)   Height: 5' 3\" (1.6 m)     Estimated body mass index is 24.94 kg/m² as calculated from the following:    Height as of this encounter: 5' 3\" (1.6 m). Weight as of this encounter: 140 lb 12.8 oz (63.9 kg). GEN:  elderly WDWN female patient seated in recliner chair in NAD. HEAD:  atraumatic, normocephalic.  EYES: EOMI, PERRL, no cataracts, conjunctivae appear normal.  ENT:  Poor hearing, bilateral EACs with cerumen impactions, TM's obscurred, nasal septum midline, no significant congestion, oral cavity without lesions, edentulous, full dentures. NECK:  fair-good ROM, no palpable masses, no carotid bruits, no JVD. LUNGS:  clear to ausc, no rales, rhonchi or wheezes. HEART:  RRR, no murmurs or gallops. ABD:  soft, non-tender to palp, no palp masses or HSM, normal BS. BACK:  no scoliosis or kyphosis, non-tender to palp. EXTREMITIES:  Trace-1+ pretibial edema, no ulcerations, varicosities or erythema. No gross deformities. MUSCULOSKELETAL:  Fair-good ROM of all joints, non tender to palp and or with movement. SKIN: No ulcerations or breakdown, rash, ecchymosis, or other lesions. NEURO: No tremor, motor UEs 4-5/5 LEs 3-4/5, sensory normal, unable to stand or walk. PSYCH:  Pleasant and cooperative. Fluid slow speech, oriented to person, place and partial time. No delusional statements. Medications reviewed. 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     Diagnoses attached to this encounter:  Emelina Bianchi was seen today for atrial fibrillation, hypertension and urinary tract infection. Diagnoses and all orders for this visit:    Chronic atrial fibrillation ([address]    Essential hypertension    Primary osteoarthritis involving multiple joints    OAB (overactive bladder)    Incontinence in female       PLAN:  Spoke with jordan Juarez on phone. Finish cipro. Continue Myrbetriq 25mg q HS.  Continue tramadol BID and as needed for pain. Fall prevention discussed. Check BMP every 3 months, next in December. Continue other meds as per Rx List. Recheck 1 month. 40 minutes spent on visit, 25 minutes involved education/counseling regarding cardiac, urinary and GI disease processes, treatment options, meds and coordination of care. Current Outpatient Medications   Medication Sig Dispense Refill    ciprofloxacin (CIPRO) 250 MG tablet Take 1 tablet by mouth 2 times daily for 7 days 14 tablet 0    polyethylene glycol (GLYCOLAX) 17 GM/SCOOP powder DISSOLVE 17GRAM (1 CAPFUL) IN 4 TO 8OZ OF BEVERAGE AND TAKE BY MOUTH DAILY. **RE-ORDER ACCORDINGLY** 510 g 11    metoprolol tartrate (LOPRESSOR) 25 MG tablet TAKE 1 TABLET BY MOUTH ONCE DAILY 31 tablet 11    mirabegron (MYRBETRIQ) 25 MG TB24 Take 1 tablet by mouth nightly 30 tablet 11    clopidogrel (PLAVIX) 75 MG tablet Take 1 tablet by mouth daily 31 tablet 11    traMADol (ULTRAM) 50 MG tablet Take 1 tablet by mouth 2 times daily for 180 days. 60 tablet 5    hydroCHLOROthiazide (HYDRODIURIL) 25 MG tablet TAKE ONE(1) TABLET BY MOUTH DAILY 31 tablet 10    amLODIPine (NORVASC) 5 MG tablet TAKE ONE(1) TABLET BY MOUTH DAILY 31 tablet 10    amiodarone (CORDARONE) 200 MG tablet TAKE ONE(1) TABLET BY MOUTH ONCE DAILY 31 tablet 10    simvastatin (ZOCOR) 20 MG tablet TAKE ONE TABLET BY MOUTH DAILY 31 tablet 10    losartan (COZAAR) 100 MG tablet TAKE ONE TABLET BY MOUTH DAILY 31 tablet 11    anastrozole (ARIMIDEX) 1 MG tablet TAKE ONE TABLET BY MOUTH DAILY **WOMEN OF CHILD-BEARING AGE SHOULD NOT HANDLE BROKEN OR CRUSHED TABLETS** 31 tablet 11    menthol-zinc oxide (CALMOSEPTINE) 0.44-20.6 % OINT ointment Apply 1 each topically 2 times daily as needed Max 30 ml per day.       Dentifrices (BIOTENE DRY MOUTH CARE DT) Place 1 Container onto teeth 4 times daily as needed      bisacodyl (DULCOLAX) 10 MG suppository Place 10 mg rectally daily as needed for Constipation      senna-docusate (SENEXON-S) 8.6-50 MG per tablet Take 1 tablet by mouth 2 times daily      loratadine (CLARITIN) 10 MG tablet Take 10 mg by mouth daily as needed (allergy sxms)      simethicone (MI-ACID GAS RELIEF) 80 MG chewable tablet Take 80 mg by mouth every 8 hours as needed for Flatulence      carboxymethylcellulose PF (REFRESH PLUS) 0.5 % SOLN ophthalmic solution 1 drop 3 times daily      polyvinyl alcohol (ARTIFICIAL TEARS) 1.4 % ophthalmic solution Place 1 drop into both eyes 3 times daily as needed for Dry Eyes      Artificial Tear Ointment (REFRESH LACRI-LUBE) OINT ointment Apply 1 inch to eye nightly as needed       magnesium oxide (MAG-OX) 400 (240 MG) MG tablet take 1 tablet by mouth once daily (Patient taking differently: Take 400 mg by mouth daily take 1 tablet by mouth once daily) 30 tablet 3    docusate sodium (COLACE) 100 MG capsule Take 200 mg by mouth daily       calcium citrate-vitamin D (CITRICAL + D) 315-250 MG-UNIT TABS per tablet Take 2 tablets by mouth daily       aspirin 81 MG tablet Take 81 mg by mouth daily      magnesium hydroxide (MILK OF MAGNESIA) 400 MG/5ML suspension Take 30 mLs by mouth daily as needed for Constipation      acetaminophen (TYLENOL) 325 MG tablet Take 650 mg by mouth every 4 hours as needed for Pain or Fever       loperamide (IMODIUM) 2 MG capsule Take 2 mg by mouth 4 times daily as needed for Diarrhea       No current facility-administered medications for this visit. Return in about 1 month (around 12/25/2020). An  electronic signature was used to authenticate this note.     --Boris Galarza MD on 11/25/2020 at 10:10 AM

## 2020-12-22 NOTE — PROGRESS NOTES
12/23/2020    Home visit medically necessary in lieu of an office visit due to: [place] resident, unable to walk, uses wheelchair, difficult to get out. HPI:   Patient says she doing well this . She is not having any dysuria. She is on Myrbetriq. She gets up at least once/night. She is not leaking quite as much now. She is moving her bowels once or twice a week, using Miralax. Her appetite is good. She is unable to walk due to weakness. She uses a WC to get around. She needs help to stand and transfer. She has not had any falls this month. She is on tramadol twice a day for chronic DJD pain. She is not c/o pain except for stiffness in her hands. REVIEW OF SYSTEMS:    GENERAL: Appetite fair-good. No weight change, generally healthy, DECLINING strength AND exercise tolerance. CARDIOVASCULAR: No chest pains, no murmurs, no palpitations, no syncope, no orthopnea. SWELLING OF LEGS. RESPIRATORY: No shortness of breath, no pain with breathing, no wheezing, no hemoptysis, no cough. GASTROINTESTINAL: No change in appetite, no dysphagia, no heartburn, no abdominal pains, no diarrhea, no bowel habit changes, no emesis, no melena, no hemorrhoids. STRUGGLES WITH CONSTIPATION. GENITOURINARY: No incontinence or retention, no nocturia, no frequent UTIs, no dysuria, no change in nature of urine. OAB, URGENCY. MUSCULOSKELETAL: No swelling or redness of joints, no limitation of range of motion, no numbness. JOINT & MUSCLE PAIN AND WEAKNESS. NEURO/PSYCH: No tremor, no seizures, no changes in mentation, no ataxia. No depressive symptoms, no changes in sleep habits, no changes in thought content. MEMORY LOSS, CONFUSION. All other systems negative.     Allergies   Allergen Reactions    Lopid [Gemfibrozil] Other (See Comments)     Leg weakness     Past Medical History:   Diagnosis Date    Arthritis     Atrial fibrillation ([facility] [address]     Chronic renal impairment, stage 3 (moderate) 11/1/2017    [place] (hard of hearing)     palp, no palp masses or HSM, normal BS. BACK:  no scoliosis or kyphosis, non-tender to palp. EXTREMITIES:  Trace-1+ pretibial edema, no ulcerations, varicosities or erythema. No gross deformities. MUSCULOSKELETAL:  Fair-good ROM of all joints, non tender to palp and or with movement. SKIN: No ulcerations or breakdown, rash, ecchymosis, or other lesions. NEURO: No tremor, motor UEs 4-5/5 LEs 3-4/5, sensory normal, unable to stand or walk. PSYCH:  Pleasant and cooperative. Fluid slow speech, oriented to person, place and partial time. No delusional statements. Medications reviewed. 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     Diagnoses attached to this encounter:  Liliana Reaves was seen today for hypertension and joint pain. Diagnoses and all orders for this visit:    Chronic atrial fibrillation ([address]    Essential hypertension    Primary osteoarthritis involving multiple joints    OAB (overactive bladder)    Senile debility    Unable to walk       PLAN:  Spoke with jordan Juarez on phone. The brakes on patient's WC are broken, and she needs a new one. She uses WC for mobility at facility since she is unable to walk. Finish cipro for UTI. Continue Myrbetriq 25mg q HS. Continue tramadol BID and as needed for pain. Fall prevention discussed. Check BMP every 3 months, next in December. Continue other meds as per Rx List. Recheck 1 month.  40 minutes spent on visit, 25 minutes involved education/counseling regarding cardiac, urinary and GI disease processes, treatment options, meds and coordination of care. Current Outpatient Medications   Medication Sig Dispense Refill    polyethylene glycol (GLYCOLAX) 17 GM/SCOOP powder DISSOLVE 17GRAM (1 CAPFUL) IN 4 TO 8OZ OF BEVERAGE AND TAKE BY MOUTH DAILY. **RE-ORDER ACCORDINGLY** 510 g 11    metoprolol tartrate (LOPRESSOR) 25 MG tablet TAKE 1 TABLET BY MOUTH ONCE DAILY 31 tablet 11    mirabegron (MYRBETRIQ) 25 MG TB24 Take 1 tablet by mouth nightly 30 tablet 11    clopidogrel (PLAVIX) 75 MG tablet Take 1 tablet by mouth daily 31 tablet 11    traMADol (ULTRAM) 50 MG tablet Take 1 tablet by mouth 2 times daily for 180 days. 60 tablet 5    hydroCHLOROthiazide (HYDRODIURIL) 25 MG tablet TAKE ONE(1) TABLET BY MOUTH DAILY 31 tablet 10    amLODIPine (NORVASC) 5 MG tablet TAKE ONE(1) TABLET BY MOUTH DAILY 31 tablet 10    amiodarone (CORDARONE) 200 MG tablet TAKE ONE(1) TABLET BY MOUTH ONCE DAILY 31 tablet 10    simvastatin (ZOCOR) 20 MG tablet TAKE ONE TABLET BY MOUTH DAILY 31 tablet 10    losartan (COZAAR) 100 MG tablet TAKE ONE TABLET BY MOUTH DAILY 31 tablet 11    anastrozole (ARIMIDEX) 1 MG tablet TAKE ONE TABLET BY MOUTH DAILY **WOMEN OF CHILD-BEARING AGE SHOULD NOT HANDLE BROKEN OR CRUSHED TABLETS** 31 tablet 11    menthol-zinc oxide (CALMOSEPTINE) 0.44-20.6 % OINT ointment Apply 1 each topically 2 times daily as needed Max 30 ml per day.       Dentifrices (BIOTENE DRY MOUTH CARE DT) Place 1 Container onto teeth 4 times daily as needed      bisacodyl (DULCOLAX) 10 MG suppository Place 10 mg rectally daily as needed for Constipation      senna-docusate (SENEXON-S) 8.6-50 MG per tablet Take 1 tablet by mouth 2 times daily      loratadine (CLARITIN) 10 MG tablet Take 10 mg by mouth daily as needed (allergy sxms)      simethicone (MI-ACID GAS RELIEF) 80 MG chewable tablet Take 80 mg by mouth every 8 hours as needed for Flatulence      carboxymethylcellulose PF (REFRESH PLUS) 0.5 % SOLN ophthalmic solution 1 drop 3 times daily      polyvinyl alcohol (ARTIFICIAL TEARS) 1.4 % ophthalmic solution Place 1 drop into both eyes 3 times daily as needed for Dry Eyes      Artificial Tear Ointment (REFRESH LACRI-LUBE) OINT ointment Apply 1 inch to eye nightly as needed       magnesium oxide (MAG-OX) 400 (240 MG) MG tablet take 1 tablet by mouth once daily (Patient taking differently: Take 400 mg by mouth daily take 1 tablet by mouth once daily) 30 tablet 3    docusate sodium (COLACE) 100 MG capsule Take 200 mg by mouth daily       calcium citrate-vitamin D (CITRICAL + D) 315-250 MG-UNIT TABS per tablet Take 2 tablets by mouth daily       aspirin 81 MG tablet Take 81 mg by mouth daily      magnesium hydroxide (MILK OF MAGNESIA) 400 MG/5ML suspension Take 30 mLs by mouth daily as needed for Constipation      acetaminophen (TYLENOL) 325 MG tablet Take 650 mg by mouth every 4 hours as needed for Pain or Fever       loperamide (IMODIUM) 2 MG capsule Take 2 mg by mouth 4 times daily as needed for Diarrhea       No current facility-administered medications for this visit. Return in about 1 month (around 1/23/2021). An  electronic signature was used to authenticate this note.     --Geremias Hansen MD on 12/23/2020 at 12:44 PM

## 2021-01-01 ENCOUNTER — APPOINTMENT (OUTPATIENT)
Dept: CT IMAGING | Age: 86
DRG: 062 | End: 2021-01-01
Attending: INTERNAL MEDICINE
Payer: MEDICARE

## 2021-01-01 ENCOUNTER — OFFICE VISIT (OUTPATIENT)
Dept: PRIMARY CARE CLINIC | Age: 86
End: 2021-01-01
Payer: MEDICARE

## 2021-01-01 ENCOUNTER — APPOINTMENT (OUTPATIENT)
Dept: GENERAL RADIOLOGY | Age: 86
DRG: 062 | End: 2021-01-01
Attending: INTERNAL MEDICINE
Payer: MEDICARE

## 2021-01-01 ENCOUNTER — APPOINTMENT (OUTPATIENT)
Dept: GENERAL RADIOLOGY | Age: 86
End: 2021-01-01
Payer: MEDICARE

## 2021-01-01 ENCOUNTER — TELEPHONE (OUTPATIENT)
Dept: PRIMARY CARE CLINIC | Age: 86
End: 2021-01-01

## 2021-01-01 ENCOUNTER — APPOINTMENT (OUTPATIENT)
Dept: CT IMAGING | Age: 86
End: 2021-01-01
Payer: MEDICARE

## 2021-01-01 ENCOUNTER — OUTSIDE SERVICES (OUTPATIENT)
Dept: FAMILY MEDICINE CLINIC | Age: 86
End: 2021-01-01

## 2021-01-01 ENCOUNTER — HOSPITAL ENCOUNTER (INPATIENT)
Age: 86
LOS: 6 days | Discharge: OTHER INSTITUTION | DRG: 062 | End: 2021-04-01
Attending: INTERNAL MEDICINE | Admitting: INTERNAL MEDICINE
Payer: MEDICARE

## 2021-01-01 ENCOUNTER — APPOINTMENT (OUTPATIENT)
Dept: MRI IMAGING | Age: 86
DRG: 062 | End: 2021-01-01
Attending: INTERNAL MEDICINE
Payer: MEDICARE

## 2021-01-01 ENCOUNTER — HOSPITAL ENCOUNTER (OUTPATIENT)
Dept: CT IMAGING | Age: 86
Discharge: HOME | End: 2021-04-30
Payer: MEDICARE

## 2021-01-01 ENCOUNTER — HOSPITAL ENCOUNTER (EMERGENCY)
Age: 86
Discharge: ANOTHER HOSPITAL | End: 2021-03-26
Attending: EMERGENCY MEDICINE | Admitting: INTERNAL MEDICINE
Payer: MEDICARE

## 2021-01-01 VITALS
WEIGHT: 136 LBS | TEMPERATURE: 98.8 F | DIASTOLIC BLOOD PRESSURE: 72 MMHG | SYSTOLIC BLOOD PRESSURE: 108 MMHG | BODY MASS INDEX: 24.1 KG/M2 | OXYGEN SATURATION: 95 % | RESPIRATION RATE: 20 BRPM | HEART RATE: 73 BPM | HEIGHT: 63 IN

## 2021-01-01 VITALS
TEMPERATURE: 97.6 F | RESPIRATION RATE: 20 BRPM | DIASTOLIC BLOOD PRESSURE: 56 MMHG | SYSTOLIC BLOOD PRESSURE: 105 MMHG | OXYGEN SATURATION: 96 % | WEIGHT: 136 LBS | HEART RATE: 66 BPM | HEIGHT: 63 IN | BODY MASS INDEX: 24.1 KG/M2

## 2021-01-01 VITALS
HEART RATE: 72 BPM | BODY MASS INDEX: 25.23 KG/M2 | TEMPERATURE: 98.7 F | OXYGEN SATURATION: 98 % | SYSTOLIC BLOOD PRESSURE: 161 MMHG | DIASTOLIC BLOOD PRESSURE: 60 MMHG | RESPIRATION RATE: 16 BRPM | WEIGHT: 142.42 LBS | HEIGHT: 63 IN

## 2021-01-01 VITALS
TEMPERATURE: 98.5 F | WEIGHT: 136 LBS | SYSTOLIC BLOOD PRESSURE: 145 MMHG | DIASTOLIC BLOOD PRESSURE: 61 MMHG | HEART RATE: 63 BPM | OXYGEN SATURATION: 99 % | BODY MASS INDEX: 24.1 KG/M2 | HEIGHT: 63 IN | RESPIRATION RATE: 14 BRPM

## 2021-01-01 VITALS
BODY MASS INDEX: 24.1 KG/M2 | RESPIRATION RATE: 20 BRPM | HEART RATE: 67 BPM | DIASTOLIC BLOOD PRESSURE: 69 MMHG | OXYGEN SATURATION: 96 % | SYSTOLIC BLOOD PRESSURE: 140 MMHG | WEIGHT: 136 LBS | HEIGHT: 63 IN | TEMPERATURE: 97.6 F

## 2021-01-01 DIAGNOSIS — I48.20 CHRONIC ATRIAL FIBRILLATION (HCC): ICD-10-CM

## 2021-01-01 DIAGNOSIS — R29.898 WEAKNESS OF LEFT LOWER EXTREMITY: ICD-10-CM

## 2021-01-01 DIAGNOSIS — Z51.5 HOSPICE CARE: ICD-10-CM

## 2021-01-01 DIAGNOSIS — R47.01 APHASIA: Primary | ICD-10-CM

## 2021-01-01 DIAGNOSIS — R63.0 LOSS OF APPETITE: ICD-10-CM

## 2021-01-01 DIAGNOSIS — R54 SENILE DEBILITY: ICD-10-CM

## 2021-01-01 DIAGNOSIS — N32.81 OAB (OVERACTIVE BLADDER): ICD-10-CM

## 2021-01-01 DIAGNOSIS — M15.9 PRIMARY OSTEOARTHRITIS INVOLVING MULTIPLE JOINTS: ICD-10-CM

## 2021-01-01 DIAGNOSIS — I10 ESSENTIAL HYPERTENSION: ICD-10-CM

## 2021-01-01 DIAGNOSIS — I50.9 CHRONIC CONGESTIVE HEART FAILURE, UNSPECIFIED HEART FAILURE TYPE (HCC): Primary | ICD-10-CM

## 2021-01-01 DIAGNOSIS — R47.01 APHASIA: ICD-10-CM

## 2021-01-01 DIAGNOSIS — R26.2 UNABLE TO WALK: ICD-10-CM

## 2021-01-01 DIAGNOSIS — N99.3 PELVIC RELAXATION DUE TO VAGINAL VAULT PROLAPSE, POSTHYSTERECTOMY: ICD-10-CM

## 2021-01-01 DIAGNOSIS — I63.9 ACUTE CVA (CEREBROVASCULAR ACCIDENT) (HCC): ICD-10-CM

## 2021-01-01 DIAGNOSIS — R53.81 DECLINING FUNCTIONAL STATUS: Primary | ICD-10-CM

## 2021-01-01 DIAGNOSIS — I63.9 CEREBROVASCULAR ACCIDENT (CVA), UNSPECIFIED MECHANISM (HCC): ICD-10-CM

## 2021-01-01 DIAGNOSIS — M15.9 PRIMARY OSTEOARTHRITIS INVOLVING MULTIPLE JOINTS: Primary | ICD-10-CM

## 2021-01-01 DIAGNOSIS — I48.20 CHRONIC ATRIAL FIBRILLATION (HCC): Primary | ICD-10-CM

## 2021-01-01 DIAGNOSIS — R29.810 FACIAL DROOP: ICD-10-CM

## 2021-01-01 DIAGNOSIS — H61.23 BILATERAL HEARING LOSS DUE TO CERUMEN IMPACTION: ICD-10-CM

## 2021-01-01 DIAGNOSIS — R06.02 SOB (SHORTNESS OF BREATH): ICD-10-CM

## 2021-01-01 DIAGNOSIS — I63.9 ACUTE CVA (CEREBROVASCULAR ACCIDENT) (HCC): Primary | ICD-10-CM

## 2021-01-01 DIAGNOSIS — R06.02 SOB (SHORTNESS OF BREATH): Primary | ICD-10-CM

## 2021-01-01 DIAGNOSIS — R29.898 WEAKNESS OF RIGHT LOWER EXTREMITY: ICD-10-CM

## 2021-01-01 LAB
ALBUMIN SERPL-MCNC: 3.7 G/DL (ref 3.5–5.2)
ALBUMIN SERPL-MCNC: 3.9 G/DL (ref 3.5–5.2)
ALBUMIN SERPL-MCNC: 4.2 G/DL (ref 3.5–5.2)
ALP BLD-CCNC: 108 U/L (ref 35–104)
ALP BLD-CCNC: 118 U/L (ref 35–104)
ALP BLD-CCNC: 119 U/L (ref 35–104)
ALT SERPL-CCNC: 11 U/L (ref 0–32)
ALT SERPL-CCNC: 11 U/L (ref 0–32)
ALT SERPL-CCNC: 12 U/L (ref 0–32)
ANION GAP SERPL CALCULATED.3IONS-SCNC: 10 MMOL/L (ref 7–16)
ANION GAP SERPL CALCULATED.3IONS-SCNC: 10 MMOL/L (ref 7–16)
ANION GAP SERPL CALCULATED.3IONS-SCNC: 11 MMOL/L (ref 7–16)
ANION GAP SERPL CALCULATED.3IONS-SCNC: 11 MMOL/L (ref 7–16)
ANION GAP SERPL CALCULATED.3IONS-SCNC: 12 MMOL/L (ref 7–16)
ANION GAP SERPL CALCULATED.3IONS-SCNC: 14 MMOL/L (ref 7–16)
ANION GAP SERPL CALCULATED.3IONS-SCNC: 7 MMOL/L (ref 7–16)
ANION GAP SERPL CALCULATED.3IONS-SCNC: 8 MMOL/L (ref 7–16)
APTT: 26.5 SEC (ref 24.5–35.1)
AST SERPL-CCNC: 13 U/L (ref 0–31)
AST SERPL-CCNC: 14 U/L (ref 0–31)
AST SERPL-CCNC: 18 U/L (ref 0–31)
BASOPHILS ABSOLUTE: 0.02 E9/L (ref 0–0.2)
BASOPHILS ABSOLUTE: 0.03 E9/L (ref 0–0.2)
BASOPHILS ABSOLUTE: 0.03 E9/L (ref 0–0.2)
BASOPHILS ABSOLUTE: 0.04 E9/L (ref 0–0.2)
BASOPHILS ABSOLUTE: 0.04 E9/L (ref 0–0.2)
BASOPHILS RELATIVE PERCENT: 0.2 % (ref 0–2)
BASOPHILS RELATIVE PERCENT: 0.2 % (ref 0–2)
BASOPHILS RELATIVE PERCENT: 0.3 % (ref 0–2)
BASOPHILS RELATIVE PERCENT: 0.3 % (ref 0–2)
BASOPHILS RELATIVE PERCENT: 0.4 % (ref 0–2)
BILIRUB SERPL-MCNC: 0.6 MG/DL (ref 0–1.2)
BILIRUB SERPL-MCNC: 0.6 MG/DL (ref 0–1.2)
BILIRUB SERPL-MCNC: 0.8 MG/DL (ref 0–1.2)
BUN BLDV-MCNC: 16 MG/DL (ref 8–23)
BUN BLDV-MCNC: 17 MG/DL (ref 8–23)
BUN BLDV-MCNC: 18 MG/DL (ref 7–24)
BUN BLDV-MCNC: 18 MG/DL (ref 8–23)
BUN BLDV-MCNC: 19 MG/DL (ref 8–23)
BUN BLDV-MCNC: 22 MG/DL (ref 8–23)
BUN BLDV-MCNC: 23 MG/DL (ref 7–24)
BUN BLDV-MCNC: 24 MG/DL (ref 7–24)
BUN BLDV-MCNC: 26 MG/DL (ref 8–23)
BUN BLDV-MCNC: 31 MG/DL (ref 8–23)
BUN BLDV-MCNC: 32 MG/DL (ref 8–23)
CALCIUM SERPL-MCNC: 8.6 MG/DL (ref 8.5–10.5)
CALCIUM SERPL-MCNC: 8.8 MG/DL (ref 8.5–10.5)
CALCIUM SERPL-MCNC: 8.8 MG/DL (ref 8.6–10.2)
CALCIUM SERPL-MCNC: 8.8 MG/DL (ref 8.6–10.2)
CALCIUM SERPL-MCNC: 8.9 MG/DL (ref 8.6–10.2)
CALCIUM SERPL-MCNC: 9 MG/DL (ref 8.6–10.2)
CALCIUM SERPL-MCNC: 9 MG/DL (ref 8.6–10.2)
CALCIUM SERPL-MCNC: 9.1 MG/DL (ref 8.6–10.2)
CALCIUM SERPL-MCNC: 9.2 MG/DL (ref 8.6–10.2)
CALCIUM SERPL-MCNC: 9.3 MG/DL (ref 8.5–10.5)
CALCIUM SERPL-MCNC: 9.4 MG/DL (ref 8.6–10.2)
CHLORIDE BLD-SCNC: 100 MMOL/L (ref 98–107)
CHLORIDE BLD-SCNC: 81 MMOL/L (ref 98–107)
CHLORIDE BLD-SCNC: 81 MMOL/L (ref 98–107)
CHLORIDE BLD-SCNC: 84 MMOL/L (ref 98–107)
CHLORIDE BLD-SCNC: 88 MMOL/L (ref 98–107)
CHLORIDE BLD-SCNC: 89 MMOL/L (ref 98–107)
CHLORIDE BLD-SCNC: 89 MMOL/L (ref 98–107)
CHLORIDE BLD-SCNC: 92 MMOL/L (ref 98–107)
CHLORIDE BLD-SCNC: 94 MMOL/L (ref 98–107)
CHOLESTEROL, FASTING: 132 MG/DL (ref 0–199)
CO2: 25 MMOL/L (ref 22–29)
CO2: 28 MMOL/L (ref 22–29)
CO2: 29 MMOL/L (ref 22–29)
CO2: 29 MMOL/L (ref 22–29)
CO2: 30 MMOL/L (ref 22–29)
CO2: 30 MMOL/L (ref 22–29)
CO2: 31 MMOL/L (ref 22–29)
CO2: 32 MMOL/L (ref 21–32)
CO2: 32 MMOL/L (ref 22–29)
CO2: 33 MMOL/L (ref 21–32)
CO2: 35 MMOL/L (ref 21–32)
CREAT SERPL-MCNC: 0.7 MG/DL (ref 0.5–1)
CREAT SERPL-MCNC: 0.8 MG/DL (ref 0.5–1)
CREAT SERPL-MCNC: 0.85 MG/DL (ref 0.55–1.02)
CREAT SERPL-MCNC: 0.9 MG/DL (ref 0.5–1)
CREAT SERPL-MCNC: 0.9 MG/DL (ref 0.5–1)
CREAT SERPL-MCNC: 1 MG/DL (ref 0.55–1.02)
CREAT SERPL-MCNC: 1 MG/DL (ref 0.5–1)
CREAT SERPL-MCNC: 1 MG/DL (ref 0.5–1)
CREAT SERPL-MCNC: 1.11 MG/DL (ref 0.55–1.02)
EKG ATRIAL RATE: 67 BPM
EKG ATRIAL RATE: 72 BPM
EKG P AXIS: 65 DEGREES
EKG P AXIS: 79 DEGREES
EKG P-R INTERVAL: 318 MS
EKG P-R INTERVAL: 344 MS
EKG Q-T INTERVAL: 442 MS
EKG Q-T INTERVAL: 464 MS
EKG QRS DURATION: 100 MS
EKG QRS DURATION: 102 MS
EKG QTC CALCULATION (BAZETT): 467 MS
EKG QTC CALCULATION (BAZETT): 508 MS
EKG R AXIS: 49 DEGREES
EKG R AXIS: 75 DEGREES
EKG T AXIS: 56 DEGREES
EKG T AXIS: 94 DEGREES
EKG VENTRICULAR RATE: 67 BPM
EKG VENTRICULAR RATE: 72 BPM
EOSINOPHILS ABSOLUTE: 0.01 E9/L (ref 0.05–0.5)
EOSINOPHILS ABSOLUTE: 0.04 E9/L (ref 0.05–0.5)
EOSINOPHILS ABSOLUTE: 0.07 E9/L (ref 0.05–0.5)
EOSINOPHILS ABSOLUTE: 0.1 E9/L (ref 0.05–0.5)
EOSINOPHILS ABSOLUTE: 0.2 E9/L (ref 0.05–0.5)
EOSINOPHILS RELATIVE PERCENT: 0.1 % (ref 0–6)
EOSINOPHILS RELATIVE PERCENT: 0.4 % (ref 0–6)
EOSINOPHILS RELATIVE PERCENT: 0.5 % (ref 0–6)
EOSINOPHILS RELATIVE PERCENT: 1.1 % (ref 0–6)
EOSINOPHILS RELATIVE PERCENT: 1.3 % (ref 0–6)
GFR AFRICAN AMERICAN: 54 ML/MIN
GFR AFRICAN AMERICAN: > 60 ML/MIN
GFR AFRICAN AMERICAN: > 60 ML/MIN
GFR AFRICAN AMERICAN: >60
GFR NON-AFRICAN AMERICAN: 51 ML/MIN/1.73
GFR NON-AFRICAN AMERICAN: 51 ML/MIN/1.73
GFR NON-AFRICAN AMERICAN: 58 ML/MIN/1.73
GFR NON-AFRICAN AMERICAN: 58 ML/MIN/1.73
GFR NON-AFRICAN AMERICAN: >60 ML/MIN/1.73
GFR SERPL CREATININE-BSD FRML MDRD: 45 ML/MIN/
GFR SERPL CREATININE-BSD FRML MDRD: 51 ML/MIN/
GFR SERPL CREATININE-BSD FRML MDRD: >60 ML/MIN/
GLUCOSE BLD-MCNC: 101 MG/DL (ref 74–99)
GLUCOSE BLD-MCNC: 101 MG/DL (ref 74–99)
GLUCOSE BLD-MCNC: 102 MG/DL (ref 74–99)
GLUCOSE BLD-MCNC: 105 MG/DL (ref 74–99)
GLUCOSE BLD-MCNC: 108 MG/DL (ref 74–99)
GLUCOSE BLD-MCNC: 117 MG/DL (ref 74–99)
GLUCOSE BLD-MCNC: 123 MG/DL (ref 74–99)
GLUCOSE BLD-MCNC: 169 MG/DL (ref 74–99)
GLUCOSE: 84 MG/DL (ref 65–99)
GLUCOSE: 85 MG/DL (ref 65–99)
GLUCOSE: 89 MG/DL (ref 65–99)
HBA1C MFR BLD: 5.4 % (ref 4–5.6)
HCT VFR BLD CALC: 32.6 % (ref 34–48)
HCT VFR BLD CALC: 33 % (ref 34–48)
HCT VFR BLD CALC: 33.3 % (ref 34–48)
HCT VFR BLD CALC: 33.3 % (ref 34–48)
HCT VFR BLD CALC: 33.4 % (ref 34–48)
HCT VFR BLD CALC: 35 % (ref 34–48)
HDLC SERPL-MCNC: 54 MG/DL
HEMOGLOBIN: 11.2 G/DL (ref 11.5–15.5)
HEMOGLOBIN: 11.3 G/DL (ref 11.5–15.5)
HEMOGLOBIN: 11.3 G/DL (ref 11.5–15.5)
HEMOGLOBIN: 11.4 G/DL (ref 11.5–15.5)
HEMOGLOBIN: 11.4 G/DL (ref 11.5–15.5)
HEMOGLOBIN: 11.6 G/DL (ref 11.5–15.5)
IMMATURE GRANULOCYTES #: 0.06 E9/L
IMMATURE GRANULOCYTES #: 0.06 E9/L
IMMATURE GRANULOCYTES #: 0.09 E9/L
IMMATURE GRANULOCYTES #: 0.09 E9/L
IMMATURE GRANULOCYTES #: 0.1 E9/L
IMMATURE GRANULOCYTES %: 0.5 % (ref 0–5)
IMMATURE GRANULOCYTES %: 0.6 % (ref 0–5)
IMMATURE GRANULOCYTES %: 0.6 % (ref 0–5)
IMMATURE GRANULOCYTES %: 0.7 % (ref 0–5)
IMMATURE GRANULOCYTES %: 1 % (ref 0–5)
INR BLD: 1
LDL CHOLESTEROL CALCULATED: 62 MG/DL (ref 0–99)
LV EF: 63 %
LVEF MODALITY: NORMAL
LYMPHOCYTES ABSOLUTE: 0.77 E9/L (ref 1.5–4)
LYMPHOCYTES ABSOLUTE: 0.96 E9/L (ref 1.5–4)
LYMPHOCYTES ABSOLUTE: 1.03 E9/L (ref 1.5–4)
LYMPHOCYTES ABSOLUTE: 1.14 E9/L (ref 1.5–4)
LYMPHOCYTES ABSOLUTE: 1.15 E9/L (ref 1.5–4)
LYMPHOCYTES RELATIVE PERCENT: 10.3 % (ref 20–42)
LYMPHOCYTES RELATIVE PERCENT: 11.2 % (ref 20–42)
LYMPHOCYTES RELATIVE PERCENT: 6.3 % (ref 20–42)
LYMPHOCYTES RELATIVE PERCENT: 7.1 % (ref 20–42)
LYMPHOCYTES RELATIVE PERCENT: 7.6 % (ref 20–42)
MAGNESIUM: 1.8 MG/DL (ref 1.6–2.6)
MAGNESIUM: 2.3 MG/DL (ref 1.6–2.6)
MAGNESIUM: 2.4 MG/DL (ref 1.6–2.6)
MCH RBC QN AUTO: 29.9 PG (ref 26–35)
MCH RBC QN AUTO: 30.4 PG (ref 26–35)
MCH RBC QN AUTO: 30.5 PG (ref 26–35)
MCH RBC QN AUTO: 30.8 PG (ref 26–35)
MCHC RBC AUTO-ENTMCNC: 32.6 % (ref 32–34.5)
MCHC RBC AUTO-ENTMCNC: 33.5 % (ref 32–34.5)
MCHC RBC AUTO-ENTMCNC: 34.2 % (ref 32–34.5)
MCHC RBC AUTO-ENTMCNC: 34.2 % (ref 32–34.5)
MCHC RBC AUTO-ENTMCNC: 34.7 % (ref 32–34.5)
MCHC RBC AUTO-ENTMCNC: 34.8 % (ref 32–34.5)
MCV RBC AUTO: 87.9 FL (ref 80–99.9)
MCV RBC AUTO: 88.3 FL (ref 80–99.9)
MCV RBC AUTO: 88.9 FL (ref 80–99.9)
MCV RBC AUTO: 89 FL (ref 80–99.9)
MCV RBC AUTO: 90.5 FL (ref 80–99.9)
MCV RBC AUTO: 91.9 FL (ref 80–99.9)
METER GLUCOSE: 110 MG/DL (ref 74–99)
METER GLUCOSE: 113 MG/DL (ref 74–99)
METER GLUCOSE: 114 MG/DL (ref 74–99)
METER GLUCOSE: 115 MG/DL (ref 74–99)
METER GLUCOSE: 115 MG/DL (ref 74–99)
METER GLUCOSE: 120 MG/DL (ref 74–99)
METER GLUCOSE: 127 MG/DL (ref 74–99)
METER GLUCOSE: 129 MG/DL (ref 74–99)
METER GLUCOSE: 130 MG/DL (ref 74–99)
METER GLUCOSE: 130 MG/DL (ref 74–99)
METER GLUCOSE: 133 MG/DL (ref 74–99)
METER GLUCOSE: 134 MG/DL (ref 74–99)
METER GLUCOSE: 143 MG/DL (ref 74–99)
METER GLUCOSE: 146 MG/DL (ref 74–99)
METER GLUCOSE: 148 MG/DL (ref 74–99)
METER GLUCOSE: 152 MG/DL (ref 74–99)
METER GLUCOSE: 155 MG/DL (ref 74–99)
METER GLUCOSE: 164 MG/DL (ref 74–99)
METER GLUCOSE: 194 MG/DL (ref 74–99)
METER GLUCOSE: 218 MG/DL (ref 74–99)
METER GLUCOSE: 75 MG/DL (ref 74–99)
MONOCYTES ABSOLUTE: 0.62 E9/L (ref 0.1–0.95)
MONOCYTES ABSOLUTE: 0.77 E9/L (ref 0.1–0.95)
MONOCYTES ABSOLUTE: 0.86 E9/L (ref 0.1–0.95)
MONOCYTES ABSOLUTE: 0.86 E9/L (ref 0.1–0.95)
MONOCYTES ABSOLUTE: 0.98 E9/L (ref 0.1–0.95)
MONOCYTES RELATIVE PERCENT: 5.1 % (ref 2–12)
MONOCYTES RELATIVE PERCENT: 5.8 % (ref 2–12)
MONOCYTES RELATIVE PERCENT: 6.8 % (ref 2–12)
MONOCYTES RELATIVE PERCENT: 7.5 % (ref 2–12)
MONOCYTES RELATIVE PERCENT: 9.3 % (ref 2–12)
NEUTROPHILS ABSOLUTE: 10.76 E9/L (ref 1.8–7.3)
NEUTROPHILS ABSOLUTE: 12.27 E9/L (ref 1.8–7.3)
NEUTROPHILS ABSOLUTE: 12.58 E9/L (ref 1.8–7.3)
NEUTROPHILS ABSOLUTE: 7.24 E9/L (ref 1.8–7.3)
NEUTROPHILS ABSOLUTE: 8.19 E9/L (ref 1.8–7.3)
NEUTROPHILS RELATIVE PERCENT: 77.9 % (ref 43–80)
NEUTROPHILS RELATIVE PERCENT: 80 % (ref 43–80)
NEUTROPHILS RELATIVE PERCENT: 84.3 % (ref 43–80)
NEUTROPHILS RELATIVE PERCENT: 84.8 % (ref 43–80)
NEUTROPHILS RELATIVE PERCENT: 87.8 % (ref 43–80)
PDW BLD-RTO: 13 FL (ref 11.5–15)
PDW BLD-RTO: 13.2 FL (ref 11.5–15)
PDW BLD-RTO: 13.2 FL (ref 11.5–15)
PDW BLD-RTO: 13.3 FL (ref 11.5–15)
PDW BLD-RTO: 13.8 FL (ref 11.5–15)
PDW BLD-RTO: 13.9 FL (ref 11.5–15)
PHOSPHORUS: 2.5 MG/DL (ref 2.5–4.5)
PHOSPHORUS: 3.4 MG/DL (ref 2.5–4.5)
PLATELET # BLD: 236 E9/L (ref 130–450)
PLATELET # BLD: 246 E9/L (ref 130–450)
PLATELET # BLD: 252 E9/L (ref 130–450)
PLATELET # BLD: 255 E9/L (ref 130–450)
PLATELET # BLD: 257 E9/L (ref 130–450)
PLATELET # BLD: 292 E9/L (ref 130–450)
PMV BLD AUTO: 10 FL (ref 7–12)
PMV BLD AUTO: 10.1 FL (ref 7–12)
PMV BLD AUTO: 10.3 FL (ref 7–12)
PMV BLD AUTO: 10.5 FL (ref 7–12)
PMV BLD AUTO: 10.6 FL (ref 7–12)
PMV BLD AUTO: 10.8 FL (ref 7–12)
POTASSIUM REFLEX MAGNESIUM: 2.9 MMOL/L (ref 3.5–5)
POTASSIUM SERPL-SCNC: 3.1 MMOL/L (ref 3.5–5.1)
POTASSIUM SERPL-SCNC: 3.2 MMOL/L (ref 3.5–5.1)
POTASSIUM SERPL-SCNC: 3.3 MMOL/L (ref 3.5–5)
POTASSIUM SERPL-SCNC: 3.3 MMOL/L (ref 3.5–5)
POTASSIUM SERPL-SCNC: 3.7 MMOL/L (ref 3.5–5)
POTASSIUM SERPL-SCNC: 3.8 MMOL/L (ref 3.5–5.1)
POTASSIUM SERPL-SCNC: 4.1 MMOL/L (ref 3.5–5)
POTASSIUM SERPL-SCNC: 4.1 MMOL/L (ref 3.5–5)
POTASSIUM SERPL-SCNC: 4.4 MMOL/L (ref 3.5–5)
POTASSIUM SERPL-SCNC: 4.6 MMOL/L (ref 3.5–5)
PRO-BNP: 3384 PG/ML (ref 0–450)
PROTHROMBIN TIME: 11.2 SEC (ref 9.3–12.4)
RBC # BLD: 3.69 E12/L (ref 3.5–5.5)
RBC # BLD: 3.71 E12/L (ref 3.5–5.5)
RBC # BLD: 3.71 E12/L (ref 3.5–5.5)
RBC # BLD: 3.74 E12/L (ref 3.5–5.5)
RBC # BLD: 3.77 E12/L (ref 3.5–5.5)
RBC # BLD: 3.81 E12/L (ref 3.5–5.5)
SARS-COV-2, NAAT: NOT DETECTED
SODIUM BLD-SCNC: 121 MMOL/L (ref 132–146)
SODIUM BLD-SCNC: 124 MMOL/L (ref 132–146)
SODIUM BLD-SCNC: 127 MMOL/L (ref 132–146)
SODIUM BLD-SCNC: 128 MMOL/L (ref 132–146)
SODIUM BLD-SCNC: 128 MMOL/L (ref 132–146)
SODIUM BLD-SCNC: 129 MMOL/L (ref 136–145)
SODIUM BLD-SCNC: 130 MMOL/L (ref 136–145)
SODIUM BLD-SCNC: 131 MMOL/L (ref 132–146)
SODIUM BLD-SCNC: 132 MMOL/L (ref 136–145)
SODIUM BLD-SCNC: 133 MMOL/L (ref 132–146)
SODIUM BLD-SCNC: 136 MMOL/L (ref 132–146)
TOTAL PROTEIN: 6.4 G/DL (ref 6.4–8.3)
TOTAL PROTEIN: 6.6 G/DL (ref 6.4–8.3)
TOTAL PROTEIN: 6.8 G/DL (ref 6.4–8.3)
TRIGLYCERIDE, FASTING: 78 MG/DL (ref 0–149)
TROPONIN: <0.01 NG/ML (ref 0–0.03)
VLDLC SERPL CALC-MCNC: 16 MG/DL
WBC # BLD: 10.2 E9/L (ref 4.5–11.5)
WBC # BLD: 12.2 E9/L (ref 4.5–11.5)
WBC # BLD: 13 E9/L (ref 4.5–11.5)
WBC # BLD: 14.5 E9/L (ref 4.5–11.5)
WBC # BLD: 14.9 E9/L (ref 4.5–11.5)
WBC # BLD: 9.3 E9/L (ref 4.5–11.5)

## 2021-01-01 PROCEDURE — 80048 BASIC METABOLIC PNL TOTAL CA: CPT

## 2021-01-01 PROCEDURE — 84100 ASSAY OF PHOSPHORUS: CPT

## 2021-01-01 PROCEDURE — 2580000003 HC RX 258: Performed by: EMERGENCY MEDICINE

## 2021-01-01 PROCEDURE — 71045 X-RAY EXAM CHEST 1 VIEW: CPT

## 2021-01-01 PROCEDURE — 6360000002 HC RX W HCPCS: Performed by: NURSE PRACTITIONER

## 2021-01-01 PROCEDURE — 36415 COLL VENOUS BLD VENIPUNCTURE: CPT

## 2021-01-01 PROCEDURE — 82962 GLUCOSE BLOOD TEST: CPT

## 2021-01-01 PROCEDURE — 6370000000 HC RX 637 (ALT 250 FOR IP): Performed by: NURSE PRACTITIONER

## 2021-01-01 PROCEDURE — 2500000003 HC RX 250 WO HCPCS

## 2021-01-01 PROCEDURE — 83880 ASSAY OF NATRIURETIC PEPTIDE: CPT

## 2021-01-01 PROCEDURE — 6370000000 HC RX 637 (ALT 250 FOR IP): Performed by: STUDENT IN AN ORGANIZED HEALTH CARE EDUCATION/TRAINING PROGRAM

## 2021-01-01 PROCEDURE — 2700000000 HC OXYGEN THERAPY PER DAY

## 2021-01-01 PROCEDURE — 2500000003 HC RX 250 WO HCPCS: Performed by: NURSE PRACTITIONER

## 2021-01-01 PROCEDURE — 2580000003 HC RX 258: Performed by: NURSE PRACTITIONER

## 2021-01-01 PROCEDURE — 3E03317 INTRODUCTION OF OTHER THROMBOLYTIC INTO PERIPHERAL VEIN, PERCUTANEOUS APPROACH: ICD-10-PCS | Performed by: INTERNAL MEDICINE

## 2021-01-01 PROCEDURE — 2000000000 HC ICU R&B

## 2021-01-01 PROCEDURE — 97530 THERAPEUTIC ACTIVITIES: CPT

## 2021-01-01 PROCEDURE — 6370000000 HC RX 637 (ALT 250 FOR IP): Performed by: INTERNAL MEDICINE

## 2021-01-01 PROCEDURE — 6360000004 HC RX CONTRAST MEDICATION: Performed by: RADIOLOGY

## 2021-01-01 PROCEDURE — 2580000003 HC RX 258: Performed by: INTERNAL MEDICINE

## 2021-01-01 PROCEDURE — 96367 TX/PROPH/DG ADDL SEQ IV INF: CPT

## 2021-01-01 PROCEDURE — 70450 CT HEAD/BRAIN W/O DYE: CPT

## 2021-01-01 PROCEDURE — 97535 SELF CARE MNGMENT TRAINING: CPT

## 2021-01-01 PROCEDURE — 85025 COMPLETE CBC W/AUTO DIFF WBC: CPT

## 2021-01-01 PROCEDURE — 99233 SBSQ HOSP IP/OBS HIGH 50: CPT | Performed by: PHYSICIAN ASSISTANT

## 2021-01-01 PROCEDURE — 80053 COMPREHEN METABOLIC PANEL: CPT

## 2021-01-01 PROCEDURE — 92526 ORAL FUNCTION THERAPY: CPT

## 2021-01-01 PROCEDURE — 83735 ASSAY OF MAGNESIUM: CPT

## 2021-01-01 PROCEDURE — 85730 THROMBOPLASTIN TIME PARTIAL: CPT

## 2021-01-01 PROCEDURE — 0042T CT BRAIN PERFUSION: CPT

## 2021-01-01 PROCEDURE — APPSS15 APP SPLIT SHARED TIME 0-15 MINUTES: Performed by: NURSE PRACTITIONER

## 2021-01-01 PROCEDURE — 99221 1ST HOSP IP/OBS SF/LOW 40: CPT | Performed by: STUDENT IN AN ORGANIZED HEALTH CARE EDUCATION/TRAINING PROGRAM

## 2021-01-01 PROCEDURE — 6360000002 HC RX W HCPCS: Performed by: STUDENT IN AN ORGANIZED HEALTH CARE EDUCATION/TRAINING PROGRAM

## 2021-01-01 PROCEDURE — 99285 EMERGENCY DEPT VISIT HI MDM: CPT

## 2021-01-01 PROCEDURE — 2580000003 HC RX 258: Performed by: STUDENT IN AN ORGANIZED HEALTH CARE EDUCATION/TRAINING PROGRAM

## 2021-01-01 PROCEDURE — 96375 TX/PRO/DX INJ NEW DRUG ADDON: CPT

## 2021-01-01 PROCEDURE — 84484 ASSAY OF TROPONIN QUANT: CPT

## 2021-01-01 PROCEDURE — APPSS30 APP SPLIT SHARED TIME 16-30 MINUTES: Performed by: NURSE PRACTITIONER

## 2021-01-01 PROCEDURE — 85027 COMPLETE CBC AUTOMATED: CPT

## 2021-01-01 PROCEDURE — 99232 SBSQ HOSP IP/OBS MODERATE 35: CPT | Performed by: PHYSICIAN ASSISTANT

## 2021-01-01 PROCEDURE — 2060000000 HC ICU INTERMEDIATE R&B

## 2021-01-01 PROCEDURE — 99232 SBSQ HOSP IP/OBS MODERATE 35: CPT | Performed by: NURSE PRACTITIONER

## 2021-01-01 PROCEDURE — 99221 1ST HOSP IP/OBS SF/LOW 40: CPT | Performed by: PSYCHIATRY & NEUROLOGY

## 2021-01-01 PROCEDURE — 92610 EVALUATE SWALLOWING FUNCTION: CPT | Performed by: SPEECH-LANGUAGE PATHOLOGIST

## 2021-01-01 PROCEDURE — 97162 PT EVAL MOD COMPLEX 30 MIN: CPT

## 2021-01-01 PROCEDURE — 70551 MRI BRAIN STEM W/O DYE: CPT

## 2021-01-01 PROCEDURE — 96366 THER/PROPH/DIAG IV INF ADDON: CPT

## 2021-01-01 PROCEDURE — 93005 ELECTROCARDIOGRAM TRACING: CPT | Performed by: STUDENT IN AN ORGANIZED HEALTH CARE EDUCATION/TRAINING PROGRAM

## 2021-01-01 PROCEDURE — 99449 NTRPROF PH1/NTRNET/EHR 31/>: CPT | Performed by: PSYCHIATRY & NEUROLOGY

## 2021-01-01 PROCEDURE — 92507 TX SP LANG VOICE COMM INDIV: CPT | Performed by: SPEECH-LANGUAGE PATHOLOGIST

## 2021-01-01 PROCEDURE — 70496 CT ANGIOGRAPHY HEAD: CPT

## 2021-01-01 PROCEDURE — 92526 ORAL FUNCTION THERAPY: CPT | Performed by: SPEECH-LANGUAGE PATHOLOGIST

## 2021-01-01 PROCEDURE — 96376 TX/PRO/DX INJ SAME DRUG ADON: CPT

## 2021-01-01 PROCEDURE — 2500000003 HC RX 250 WO HCPCS: Performed by: EMERGENCY MEDICINE

## 2021-01-01 PROCEDURE — 97166 OT EVAL MOD COMPLEX 45 MIN: CPT

## 2021-01-01 PROCEDURE — 87635 SARS-COV-2 COVID-19 AMP PRB: CPT

## 2021-01-01 PROCEDURE — 99291 CRITICAL CARE FIRST HOUR: CPT | Performed by: SURGERY

## 2021-01-01 PROCEDURE — 93306 TTE W/DOPPLER COMPLETE: CPT

## 2021-01-01 PROCEDURE — 93010 ELECTROCARDIOGRAM REPORT: CPT | Performed by: INTERNAL MEDICINE

## 2021-01-01 PROCEDURE — 92523 SPEECH SOUND LANG COMPREHEN: CPT | Performed by: SPEECH-LANGUAGE PATHOLOGIST

## 2021-01-01 PROCEDURE — 70498 CT ANGIOGRAPHY NECK: CPT

## 2021-01-01 PROCEDURE — 83036 HEMOGLOBIN GLYCOSYLATED A1C: CPT

## 2021-01-01 PROCEDURE — 85610 PROTHROMBIN TIME: CPT

## 2021-01-01 PROCEDURE — 96365 THER/PROPH/DIAG IV INF INIT: CPT

## 2021-01-01 PROCEDURE — 80061 LIPID PANEL: CPT

## 2021-01-01 PROCEDURE — 37195 THROMBOLYTIC THERAPY STROKE: CPT

## 2021-01-01 PROCEDURE — 69210 REMOVE IMPACTED EAR WAX UNI: CPT | Performed by: FAMILY MEDICINE

## 2021-01-01 RX ORDER — LISINOPRIL 10 MG/1
10 TABLET ORAL DAILY
Qty: 30 TABLET | Refills: 3 | DISCHARGE
Start: 2021-01-01

## 2021-01-01 RX ORDER — METOLAZONE 2.5 MG/1
2.5 TABLET ORAL
Qty: 40 TABLET | Refills: 3 | Status: SHIPPED
Start: 2021-01-01 | End: 2021-01-01

## 2021-01-01 RX ORDER — ATORVASTATIN CALCIUM 20 MG/1
20 TABLET, FILM COATED ORAL NIGHTLY
Status: DISCONTINUED | OUTPATIENT
Start: 2021-01-01 | End: 2021-01-01

## 2021-01-01 RX ORDER — 0.9 % SODIUM CHLORIDE 0.9 %
50 INTRAVENOUS SOLUTION INTRAVENOUS ONCE
Status: COMPLETED | OUTPATIENT
Start: 2021-01-01 | End: 2021-01-01

## 2021-01-01 RX ORDER — ACETAMINOPHEN 500 MG
500 TABLET ORAL EVERY 6 HOURS PRN
Status: DISCONTINUED | OUTPATIENT
Start: 2021-01-01 | End: 2021-01-01 | Stop reason: HOSPADM

## 2021-01-01 RX ORDER — SODIUM CHLORIDE 0.9 % (FLUSH) 0.9 %
10 SYRINGE (ML) INJECTION EVERY 12 HOURS SCHEDULED
Status: CANCELLED | OUTPATIENT
Start: 2021-01-01

## 2021-01-01 RX ORDER — SODIUM CHLORIDE 0.9 % (FLUSH) 0.9 %
10 SYRINGE (ML) INJECTION PRN
Status: DISCONTINUED | OUTPATIENT
Start: 2021-01-01 | End: 2021-01-01 | Stop reason: HOSPADM

## 2021-01-01 RX ORDER — HYDRALAZINE HYDROCHLORIDE 20 MG/ML
10 INJECTION INTRAMUSCULAR; INTRAVENOUS EVERY 10 MIN PRN
Status: DISCONTINUED | OUTPATIENT
Start: 2021-01-01 | End: 2021-01-01

## 2021-01-01 RX ORDER — LABETALOL HYDROCHLORIDE 5 MG/ML
10 INJECTION, SOLUTION INTRAVENOUS EVERY 10 MIN PRN
Status: DISCONTINUED | OUTPATIENT
Start: 2021-01-01 | End: 2021-01-01

## 2021-01-01 RX ORDER — SODIUM CHLORIDE 9 MG/ML
25 INJECTION, SOLUTION INTRAVENOUS PRN
Status: DISCONTINUED | OUTPATIENT
Start: 2021-01-01 | End: 2021-01-01

## 2021-01-01 RX ORDER — POTASSIUM CHLORIDE 750 MG/1
10 TABLET, FILM COATED, EXTENDED RELEASE ORAL 2 TIMES DAILY
Qty: 180 TABLET | Refills: 3 | Status: SHIPPED
Start: 2021-01-01 | End: 2021-01-01 | Stop reason: SDUPTHER

## 2021-01-01 RX ORDER — DEXTROSE MONOHYDRATE 25 G/50ML
12.5 INJECTION, SOLUTION INTRAVENOUS
Status: DISCONTINUED | OUTPATIENT
Start: 2021-01-01 | End: 2021-01-01 | Stop reason: HOSPADM

## 2021-01-01 RX ORDER — SENNA AND DOCUSATE SODIUM 50; 8.6 MG/1; MG/1
2 TABLET, FILM COATED ORAL 2 TIMES DAILY
Status: DISCONTINUED | OUTPATIENT
Start: 2021-01-01 | End: 2021-01-01 | Stop reason: HOSPADM

## 2021-01-01 RX ORDER — SODIUM CHLORIDE 0.9 % (FLUSH) 0.9 %
10 SYRINGE (ML) INJECTION PRN
Status: CANCELLED | OUTPATIENT
Start: 2021-01-01

## 2021-01-01 RX ORDER — DEXTROSE MONOHYDRATE 25 G/50ML
12.5 INJECTION, SOLUTION INTRAVENOUS PRN
Status: DISCONTINUED | OUTPATIENT
Start: 2021-01-01 | End: 2021-01-01 | Stop reason: HOSPADM

## 2021-01-01 RX ORDER — POTASSIUM CHLORIDE 750 MG/1
10 TABLET, FILM COATED, EXTENDED RELEASE ORAL DAILY
Qty: 60 TABLET | Refills: 3 | Status: SHIPPED
Start: 2021-01-01 | End: 2021-01-01 | Stop reason: SDUPTHER

## 2021-01-01 RX ORDER — SODIUM CHLORIDE 9 MG/ML
25 INJECTION, SOLUTION INTRAVENOUS PRN
Status: DISCONTINUED | OUTPATIENT
Start: 2021-01-01 | End: 2021-01-01 | Stop reason: HOSPADM

## 2021-01-01 RX ORDER — ATORVASTATIN CALCIUM 40 MG/1
40 TABLET, FILM COATED ORAL NIGHTLY
Qty: 30 TABLET | Refills: 3 | DISCHARGE
Start: 2021-01-01

## 2021-01-01 RX ORDER — HYDRALAZINE HYDROCHLORIDE 20 MG/ML
10 INJECTION INTRAMUSCULAR; INTRAVENOUS
Status: DISCONTINUED | OUTPATIENT
Start: 2021-01-01 | End: 2021-01-01 | Stop reason: HOSPADM

## 2021-01-01 RX ORDER — HYDRALAZINE HYDROCHLORIDE 20 MG/ML
10 INJECTION INTRAMUSCULAR; INTRAVENOUS EVERY 4 HOURS PRN
Status: DISCONTINUED | OUTPATIENT
Start: 2021-01-01 | End: 2021-01-01

## 2021-01-01 RX ORDER — POTASSIUM CHLORIDE 750 MG/1
10 TABLET, FILM COATED, EXTENDED RELEASE ORAL 3 TIMES DAILY
Qty: 270 TABLET | Refills: 3 | Status: SHIPPED | OUTPATIENT
Start: 2021-01-01

## 2021-01-01 RX ORDER — LABETALOL HYDROCHLORIDE 5 MG/ML
INJECTION, SOLUTION INTRAVENOUS
Status: COMPLETED
Start: 2021-01-01 | End: 2021-01-01

## 2021-01-01 RX ORDER — SODIUM CHLORIDE 1000 MG
1 TABLET, SOLUBLE MISCELLANEOUS
Qty: 90 TABLET | Refills: 3 | DISCHARGE
Start: 2021-01-01

## 2021-01-01 RX ORDER — ATORVASTATIN CALCIUM 40 MG/1
40 TABLET, FILM COATED ORAL NIGHTLY
Status: DISCONTINUED | OUTPATIENT
Start: 2021-01-01 | End: 2021-01-01 | Stop reason: HOSPADM

## 2021-01-01 RX ORDER — AMLODIPINE BESYLATE 5 MG/1
5 TABLET ORAL DAILY
Status: DISCONTINUED | OUTPATIENT
Start: 2021-01-01 | End: 2021-01-01 | Stop reason: HOSPADM

## 2021-01-01 RX ORDER — LABETALOL HYDROCHLORIDE 5 MG/ML
40 INJECTION, SOLUTION INTRAVENOUS ONCE
Status: COMPLETED | OUTPATIENT
Start: 2021-01-01 | End: 2021-01-01

## 2021-01-01 RX ORDER — HYDRALAZINE HYDROCHLORIDE 20 MG/ML
10 INJECTION INTRAMUSCULAR; INTRAVENOUS
Status: DISCONTINUED | OUTPATIENT
Start: 2021-01-01 | End: 2021-01-01

## 2021-01-01 RX ORDER — POLYETHYLENE GLYCOL 3350 17 G/17G
17 POWDER, FOR SOLUTION ORAL DAILY
Status: DISCONTINUED | OUTPATIENT
Start: 2021-01-01 | End: 2021-01-01 | Stop reason: HOSPADM

## 2021-01-01 RX ORDER — SODIUM CHLORIDE 9 MG/ML
INJECTION, SOLUTION INTRAVENOUS CONTINUOUS
Status: DISCONTINUED | OUTPATIENT
Start: 2021-01-01 | End: 2021-01-01 | Stop reason: HOSPADM

## 2021-01-01 RX ORDER — AMIODARONE HYDROCHLORIDE 200 MG/1
200 TABLET ORAL DAILY
Status: DISCONTINUED | OUTPATIENT
Start: 2021-01-01 | End: 2021-01-01 | Stop reason: HOSPADM

## 2021-01-01 RX ORDER — POTASSIUM CHLORIDE 20 MEQ/1
40 TABLET, EXTENDED RELEASE ORAL 2 TIMES DAILY
Status: DISCONTINUED | OUTPATIENT
Start: 2021-01-01 | End: 2021-01-01 | Stop reason: HOSPADM

## 2021-01-01 RX ORDER — LISINOPRIL 10 MG/1
10 TABLET ORAL DAILY
Status: DISCONTINUED | OUTPATIENT
Start: 2021-01-01 | End: 2021-01-01 | Stop reason: HOSPADM

## 2021-01-01 RX ORDER — AMLODIPINE BESYLATE 5 MG/1
10 TABLET ORAL DAILY
Qty: 31 TABLET | Refills: 10 | DISCHARGE
Start: 2021-01-01

## 2021-01-01 RX ORDER — LABETALOL HYDROCHLORIDE 5 MG/ML
20 INJECTION, SOLUTION INTRAVENOUS ONCE
Status: COMPLETED | OUTPATIENT
Start: 2021-01-01 | End: 2021-01-01

## 2021-01-01 RX ORDER — FAMOTIDINE 20 MG/1
20 TABLET, FILM COATED ORAL DAILY
Status: DISCONTINUED | OUTPATIENT
Start: 2021-01-01 | End: 2021-01-01 | Stop reason: HOSPADM

## 2021-01-01 RX ORDER — LABETALOL HYDROCHLORIDE 5 MG/ML
10 INJECTION, SOLUTION INTRAVENOUS EVERY 4 HOURS
Status: DISCONTINUED | OUTPATIENT
Start: 2021-01-01 | End: 2021-01-01

## 2021-01-01 RX ORDER — NICOTINE POLACRILEX 4 MG
15 LOZENGE BUCCAL PRN
Status: DISCONTINUED | OUTPATIENT
Start: 2021-01-01 | End: 2021-01-01 | Stop reason: HOSPADM

## 2021-01-01 RX ORDER — SODIUM CHLORIDE 1000 MG
1 TABLET, SOLUBLE MISCELLANEOUS
Status: DISCONTINUED | OUTPATIENT
Start: 2021-01-01 | End: 2021-01-01 | Stop reason: HOSPADM

## 2021-01-01 RX ORDER — METOLAZONE 2.5 MG/1
TABLET ORAL
Qty: 14 TABLET | Refills: 11 | Status: ON HOLD
Start: 2021-01-01 | End: 2021-01-01 | Stop reason: HOSPADM

## 2021-01-01 RX ORDER — POTASSIUM CHLORIDE 7.45 MG/ML
10 INJECTION INTRAVENOUS
Status: DISCONTINUED | OUTPATIENT
Start: 2021-01-01 | End: 2021-01-01 | Stop reason: HOSPADM

## 2021-01-01 RX ORDER — ONDANSETRON 2 MG/ML
4 INJECTION INTRAMUSCULAR; INTRAVENOUS EVERY 6 HOURS PRN
Status: DISCONTINUED | OUTPATIENT
Start: 2021-01-01 | End: 2021-01-01 | Stop reason: HOSPADM

## 2021-01-01 RX ORDER — POLYETHYLENE GLYCOL 3350 17 G/17G
17 POWDER, FOR SOLUTION ORAL DAILY PRN
Status: DISCONTINUED | OUTPATIENT
Start: 2021-01-01 | End: 2021-01-01

## 2021-01-01 RX ORDER — FUROSEMIDE 40 MG/1
40 TABLET ORAL DAILY
Qty: 30 TABLET | Refills: 5 | Status: ON HOLD
Start: 2021-01-01 | End: 2021-01-01 | Stop reason: HOSPADM

## 2021-01-01 RX ORDER — LABETALOL HYDROCHLORIDE 5 MG/ML
10 INJECTION, SOLUTION INTRAVENOUS EVERY 4 HOURS PRN
Status: DISCONTINUED | OUTPATIENT
Start: 2021-01-01 | End: 2021-01-01 | Stop reason: HOSPADM

## 2021-01-01 RX ORDER — ATORVASTATIN CALCIUM 20 MG/1
20 TABLET, FILM COATED ORAL DAILY
Status: DISCONTINUED | OUTPATIENT
Start: 2021-01-01 | End: 2021-01-01

## 2021-01-01 RX ORDER — CLOPIDOGREL BISULFATE 75 MG/1
75 TABLET ORAL DAILY
Status: DISCONTINUED | OUTPATIENT
Start: 2021-01-01 | End: 2021-01-01 | Stop reason: HOSPADM

## 2021-01-01 RX ORDER — 0.9 % SODIUM CHLORIDE 0.9 %
500 INTRAVENOUS SOLUTION INTRAVENOUS ONCE
Status: COMPLETED | OUTPATIENT
Start: 2021-01-01 | End: 2021-01-01

## 2021-01-01 RX ORDER — POLYETHYLENE GLYCOL 3350 17 G/17G
17 POWDER, FOR SOLUTION ORAL DAILY PRN
Status: CANCELLED | OUTPATIENT
Start: 2021-01-01

## 2021-01-01 RX ORDER — ASPIRIN 81 MG/1
81 TABLET ORAL DAILY
Status: DISCONTINUED | OUTPATIENT
Start: 2021-01-01 | End: 2021-01-01 | Stop reason: HOSPADM

## 2021-01-01 RX ORDER — SODIUM CHLORIDE 0.9 % (FLUSH) 0.9 %
10 SYRINGE (ML) INJECTION PRN
Status: DISCONTINUED | OUTPATIENT
Start: 2021-01-01 | End: 2021-01-01

## 2021-01-01 RX ORDER — SODIUM CHLORIDE 0.9 % (FLUSH) 0.9 %
10 SYRINGE (ML) INJECTION EVERY 12 HOURS SCHEDULED
Status: DISCONTINUED | OUTPATIENT
Start: 2021-01-01 | End: 2021-01-01 | Stop reason: HOSPADM

## 2021-01-01 RX ORDER — SODIUM CHLORIDE 0.9 % (FLUSH) 0.9 %
10 SYRINGE (ML) INJECTION EVERY 12 HOURS SCHEDULED
Status: DISCONTINUED | OUTPATIENT
Start: 2021-01-01 | End: 2021-01-01

## 2021-01-01 RX ORDER — MAGNESIUM SULFATE IN WATER 40 MG/ML
2000 INJECTION, SOLUTION INTRAVENOUS ONCE
Status: COMPLETED | OUTPATIENT
Start: 2021-01-01 | End: 2021-01-01

## 2021-01-01 RX ORDER — TRAMADOL HYDROCHLORIDE 50 MG/1
50 TABLET ORAL 2 TIMES DAILY
Qty: 60 TABLET | Refills: 5 | Status: ON HOLD
Start: 2021-01-01 | End: 2021-01-01 | Stop reason: HOSPADM

## 2021-01-01 RX ORDER — DEXTROSE MONOHYDRATE 50 MG/ML
100 INJECTION, SOLUTION INTRAVENOUS PRN
Status: DISCONTINUED | OUTPATIENT
Start: 2021-01-01 | End: 2021-01-01 | Stop reason: HOSPADM

## 2021-01-01 RX ADMIN — FAMOTIDINE 20 MG: 20 TABLET ORAL at 08:13

## 2021-01-01 RX ADMIN — DOCUSATE SODIUM 50 MG AND SENNOSIDES 8.6 MG 2 TABLET: 8.6; 5 TABLET, FILM COATED ORAL at 21:05

## 2021-01-01 RX ADMIN — INSULIN LISPRO 1 UNITS: 100 INJECTION, SOLUTION INTRAVENOUS; SUBCUTANEOUS at 21:06

## 2021-01-01 RX ADMIN — Medication 10 ML: at 22:12

## 2021-01-01 RX ADMIN — POTASSIUM CHLORIDE 40 MEQ: 1500 TABLET, EXTENDED RELEASE ORAL at 08:58

## 2021-01-01 RX ADMIN — METOPROLOL TARTRATE 25 MG: 25 TABLET, FILM COATED ORAL at 08:56

## 2021-01-01 RX ADMIN — POTASSIUM CHLORIDE 10 MEQ: 7.46 INJECTION, SOLUTION INTRAVENOUS at 21:37

## 2021-01-01 RX ADMIN — LABETALOL HYDROCHLORIDE 40 MG: 5 INJECTION INTRAVENOUS at 19:19

## 2021-01-01 RX ADMIN — Medication 10 ML: at 21:38

## 2021-01-01 RX ADMIN — FAMOTIDINE 20 MG: 20 TABLET ORAL at 08:56

## 2021-01-01 RX ADMIN — Medication 1 G: at 16:54

## 2021-01-01 RX ADMIN — DOCUSATE SODIUM 50 MG AND SENNOSIDES 8.6 MG 2 TABLET: 8.6; 5 TABLET, FILM COATED ORAL at 09:18

## 2021-01-01 RX ADMIN — POTASSIUM CHLORIDE 40 MEQ: 1500 TABLET, EXTENDED RELEASE ORAL at 20:13

## 2021-01-01 RX ADMIN — POLYETHYLENE GLYCOL 3350 17 G: 17 POWDER, FOR SOLUTION ORAL at 08:14

## 2021-01-01 RX ADMIN — Medication 1 G: at 13:41

## 2021-01-01 RX ADMIN — ASPIRIN 81 MG: 81 TABLET, COATED ORAL at 08:57

## 2021-01-01 RX ADMIN — HYDRALAZINE HYDROCHLORIDE 10 MG: 20 INJECTION INTRAMUSCULAR; INTRAVENOUS at 04:17

## 2021-01-01 RX ADMIN — HYDRALAZINE HYDROCHLORIDE 10 MG: 20 INJECTION INTRAMUSCULAR; INTRAVENOUS at 15:25

## 2021-01-01 RX ADMIN — SODIUM CHLORIDE 50 ML: 9 INJECTION, SOLUTION INTRAVENOUS at 20:00

## 2021-01-01 RX ADMIN — LISINOPRIL 10 MG: 10 TABLET ORAL at 08:55

## 2021-01-01 RX ADMIN — ONDANSETRON 4 MG: 2 INJECTION INTRAMUSCULAR; INTRAVENOUS at 09:14

## 2021-01-01 RX ADMIN — AMLODIPINE BESYLATE 5 MG: 5 TABLET ORAL at 08:12

## 2021-01-01 RX ADMIN — POLYETHYLENE GLYCOL 3350 17 G: 17 POWDER, FOR SOLUTION ORAL at 09:20

## 2021-01-01 RX ADMIN — HYDRALAZINE HYDROCHLORIDE 10 MG: 20 INJECTION INTRAMUSCULAR; INTRAVENOUS at 18:10

## 2021-01-01 RX ADMIN — SODIUM CHLORIDE 500 ML: 9 INJECTION, SOLUTION INTRAVENOUS at 09:50

## 2021-01-01 RX ADMIN — Medication 1 G: at 08:38

## 2021-01-01 RX ADMIN — Medication 10 ML: at 09:19

## 2021-01-01 RX ADMIN — SODIUM CHLORIDE, PRESERVATIVE FREE 10 ML: 5 INJECTION INTRAVENOUS at 20:11

## 2021-01-01 RX ADMIN — Medication 1 G: at 08:40

## 2021-01-01 RX ADMIN — Medication 1 G: at 08:16

## 2021-01-01 RX ADMIN — Medication 10 ML: at 20:12

## 2021-01-01 RX ADMIN — ASPIRIN 81 MG: 81 TABLET, COATED ORAL at 16:48

## 2021-01-01 RX ADMIN — DOCUSATE SODIUM 50 MG AND SENNOSIDES 8.6 MG 2 TABLET: 8.6; 5 TABLET, FILM COATED ORAL at 20:11

## 2021-01-01 RX ADMIN — AMLODIPINE BESYLATE 5 MG: 5 TABLET ORAL at 08:55

## 2021-01-01 RX ADMIN — LABETALOL HYDROCHLORIDE 10 MG: 5 INJECTION INTRAVENOUS at 21:06

## 2021-01-01 RX ADMIN — AMIODARONE HYDROCHLORIDE 200 MG: 200 TABLET ORAL at 08:38

## 2021-01-01 RX ADMIN — METOPROLOL TARTRATE 25 MG: 25 TABLET, FILM COATED ORAL at 08:38

## 2021-01-01 RX ADMIN — Medication 10 ML: at 20:21

## 2021-01-01 RX ADMIN — AMLODIPINE BESYLATE 5 MG: 5 TABLET ORAL at 08:40

## 2021-01-01 RX ADMIN — AMLODIPINE BESYLATE 5 MG: 5 TABLET ORAL at 08:38

## 2021-01-01 RX ADMIN — DOCUSATE SODIUM 50 MG AND SENNOSIDES 8.6 MG 2 TABLET: 8.6; 5 TABLET, FILM COATED ORAL at 20:21

## 2021-01-01 RX ADMIN — ATORVASTATIN CALCIUM 40 MG: 40 TABLET, FILM COATED ORAL at 20:12

## 2021-01-01 RX ADMIN — POTASSIUM CHLORIDE 10 MEQ: 7.46 INJECTION, SOLUTION INTRAVENOUS at 19:19

## 2021-01-01 RX ADMIN — AMIODARONE HYDROCHLORIDE 200 MG: 200 TABLET ORAL at 08:56

## 2021-01-01 RX ADMIN — ATORVASTATIN CALCIUM 40 MG: 40 TABLET, FILM COATED ORAL at 20:21

## 2021-01-01 RX ADMIN — AMIODARONE HYDROCHLORIDE 200 MG: 200 TABLET ORAL at 08:57

## 2021-01-01 RX ADMIN — AMIODARONE HYDROCHLORIDE 200 MG: 200 TABLET ORAL at 08:14

## 2021-01-01 RX ADMIN — CLOPIDOGREL 75 MG: 75 TABLET, FILM COATED ORAL at 16:48

## 2021-01-01 RX ADMIN — METOPROLOL TARTRATE 25 MG: 25 TABLET, FILM COATED ORAL at 08:40

## 2021-01-01 RX ADMIN — CLOPIDOGREL 75 MG: 75 TABLET, FILM COATED ORAL at 08:57

## 2021-01-01 RX ADMIN — IOPAMIDOL 100 ML: 755 INJECTION, SOLUTION INTRAVENOUS at 18:01

## 2021-01-01 RX ADMIN — INSULIN LISPRO 2 UNITS: 100 INJECTION, SOLUTION INTRAVENOUS; SUBCUTANEOUS at 17:36

## 2021-01-01 RX ADMIN — INSULIN LISPRO 4 UNITS: 100 INJECTION, SOLUTION INTRAVENOUS; SUBCUTANEOUS at 12:45

## 2021-01-01 RX ADMIN — POTASSIUM CHLORIDE 40 MEQ: 1500 TABLET, EXTENDED RELEASE ORAL at 21:38

## 2021-01-01 RX ADMIN — LABETALOL HYDROCHLORIDE 20 MG: 5 INJECTION INTRAVENOUS at 18:24

## 2021-01-01 RX ADMIN — METOPROLOL TARTRATE 25 MG: 25 TABLET, FILM COATED ORAL at 08:13

## 2021-01-01 RX ADMIN — POTASSIUM CHLORIDE 10 MEQ: 7.46 INJECTION, SOLUTION INTRAVENOUS at 20:29

## 2021-01-01 RX ADMIN — Medication 1 G: at 08:57

## 2021-01-01 RX ADMIN — POTASSIUM CHLORIDE 40 MEQ: 1500 TABLET, EXTENDED RELEASE ORAL at 08:44

## 2021-01-01 RX ADMIN — HYDRALAZINE HYDROCHLORIDE 10 MG: 20 INJECTION INTRAMUSCULAR; INTRAVENOUS at 04:14

## 2021-01-01 RX ADMIN — DOCUSATE SODIUM 50 MG AND SENNOSIDES 8.6 MG 2 TABLET: 8.6; 5 TABLET, FILM COATED ORAL at 08:38

## 2021-01-01 RX ADMIN — LABETALOL HYDROCHLORIDE 20 MG: 5 INJECTION, SOLUTION INTRAVENOUS at 18:24

## 2021-01-01 RX ADMIN — ALTEPLASE 5.6 MG: KIT at 19:05

## 2021-01-01 RX ADMIN — Medication 1 G: at 13:09

## 2021-01-01 RX ADMIN — INSULIN LISPRO 2 UNITS: 100 INJECTION, SOLUTION INTRAVENOUS; SUBCUTANEOUS at 12:04

## 2021-01-01 RX ADMIN — ATORVASTATIN CALCIUM 20 MG: 20 TABLET, FILM COATED ORAL at 20:12

## 2021-01-01 RX ADMIN — AMIODARONE HYDROCHLORIDE 200 MG: 200 TABLET ORAL at 08:40

## 2021-01-01 RX ADMIN — AMLODIPINE BESYLATE 5 MG: 5 TABLET ORAL at 08:57

## 2021-01-01 RX ADMIN — SODIUM CHLORIDE, PRESERVATIVE FREE 10 ML: 5 INJECTION INTRAVENOUS at 09:15

## 2021-01-01 RX ADMIN — Medication 10 ML: at 08:56

## 2021-01-01 RX ADMIN — Medication 1 G: at 18:20

## 2021-01-01 RX ADMIN — SODIUM CHLORIDE, PRESERVATIVE FREE 10 ML: 5 INJECTION INTRAVENOUS at 08:15

## 2021-01-01 RX ADMIN — LISINOPRIL 10 MG: 10 TABLET ORAL at 08:40

## 2021-01-01 RX ADMIN — METOPROLOL TARTRATE 25 MG: 25 TABLET, FILM COATED ORAL at 09:18

## 2021-01-01 RX ADMIN — INSULIN LISPRO 2 UNITS: 100 INJECTION, SOLUTION INTRAVENOUS; SUBCUTANEOUS at 11:09

## 2021-01-01 RX ADMIN — Medication 10 ML: at 20:15

## 2021-01-01 RX ADMIN — HYDRALAZINE HYDROCHLORIDE 10 MG: 20 INJECTION INTRAMUSCULAR; INTRAVENOUS at 20:21

## 2021-01-01 RX ADMIN — ALTEPLASE 50 MG: KIT at 19:07

## 2021-01-01 RX ADMIN — FAMOTIDINE 20 MG: 20 TABLET ORAL at 08:44

## 2021-01-01 RX ADMIN — AMLODIPINE BESYLATE 5 MG: 5 TABLET ORAL at 09:19

## 2021-01-01 RX ADMIN — Medication 10 ML: at 08:58

## 2021-01-01 RX ADMIN — Medication 1 G: at 11:01

## 2021-01-01 RX ADMIN — POTASSIUM CHLORIDE 40 MEQ: 1500 TABLET, EXTENDED RELEASE ORAL at 08:56

## 2021-01-01 RX ADMIN — Medication 1 G: at 12:46

## 2021-01-01 RX ADMIN — AMIODARONE HYDROCHLORIDE 200 MG: 200 TABLET ORAL at 09:19

## 2021-01-01 RX ADMIN — DOCUSATE SODIUM 50 MG AND SENNOSIDES 8.6 MG 2 TABLET: 8.6; 5 TABLET, FILM COATED ORAL at 08:13

## 2021-01-01 RX ADMIN — ACETAMINOPHEN 500 MG: 500 TABLET, FILM COATED ORAL at 11:01

## 2021-01-01 RX ADMIN — MAGNESIUM SULFATE HEPTAHYDRATE 2000 MG: 40 INJECTION, SOLUTION INTRAVENOUS at 19:39

## 2021-01-01 RX ADMIN — ACETAMINOPHEN 500 MG: 500 TABLET, FILM COATED ORAL at 16:34

## 2021-01-01 RX ADMIN — Medication 1 G: at 16:59

## 2021-01-01 RX ADMIN — DOCUSATE SODIUM 50 MG AND SENNOSIDES 8.6 MG 2 TABLET: 8.6; 5 TABLET, FILM COATED ORAL at 20:13

## 2021-01-01 RX ADMIN — FAMOTIDINE 20 MG: 20 TABLET ORAL at 08:57

## 2021-01-01 RX ADMIN — DOCUSATE SODIUM 50 MG AND SENNOSIDES 8.6 MG 2 TABLET: 8.6; 5 TABLET, FILM COATED ORAL at 21:38

## 2021-01-01 RX ADMIN — HYDRALAZINE HYDROCHLORIDE 10 MG: 20 INJECTION INTRAMUSCULAR; INTRAVENOUS at 23:18

## 2021-01-01 RX ADMIN — POTASSIUM CHLORIDE 40 MEQ: 1500 TABLET, EXTENDED RELEASE ORAL at 20:21

## 2021-01-01 RX ADMIN — ATORVASTATIN CALCIUM 40 MG: 40 TABLET, FILM COATED ORAL at 21:05

## 2021-01-01 RX ADMIN — DOCUSATE SODIUM 50 MG AND SENNOSIDES 8.6 MG 2 TABLET: 8.6; 5 TABLET, FILM COATED ORAL at 08:56

## 2021-01-01 RX ADMIN — Medication 10 ML: at 08:38

## 2021-01-01 RX ADMIN — POTASSIUM CHLORIDE 40 MEQ: 1500 TABLET, EXTENDED RELEASE ORAL at 21:05

## 2021-01-01 RX ADMIN — FAMOTIDINE 20 MG: 20 TABLET ORAL at 08:40

## 2021-01-01 RX ADMIN — INSULIN LISPRO 2 UNITS: 100 INJECTION, SOLUTION INTRAVENOUS; SUBCUTANEOUS at 12:05

## 2021-01-01 RX ADMIN — SODIUM CHLORIDE: 9 INJECTION, SOLUTION INTRAVENOUS at 20:30

## 2021-01-01 RX ADMIN — ATORVASTATIN CALCIUM 40 MG: 40 TABLET, FILM COATED ORAL at 21:38

## 2021-01-01 RX ADMIN — FAMOTIDINE 20 MG: 20 TABLET ORAL at 09:18

## 2021-01-01 RX ADMIN — Medication 1 G: at 08:56

## 2021-01-01 RX ADMIN — Medication 1 G: at 16:48

## 2021-01-01 RX ADMIN — POTASSIUM CHLORIDE 40 MEQ: 1500 TABLET, EXTENDED RELEASE ORAL at 08:40

## 2021-01-01 SDOH — ECONOMIC STABILITY: INCOME INSECURITY: HOW HARD IS IT FOR YOU TO PAY FOR THE VERY BASICS LIKE FOOD, HOUSING, MEDICAL CARE, AND HEATING?: NOT HARD AT ALL

## 2021-01-01 SDOH — ECONOMIC STABILITY: TRANSPORTATION INSECURITY
IN THE PAST 12 MONTHS, HAS LACK OF TRANSPORTATION KEPT YOU FROM MEETINGS, WORK, OR FROM GETTING THINGS NEEDED FOR DAILY LIVING?: NO

## 2021-01-01 ASSESSMENT — ENCOUNTER SYMPTOMS
EYE PAIN: 0
BACK PAIN: 0
ABDOMINAL PAIN: 0

## 2021-01-01 ASSESSMENT — PAIN SCALES - GENERAL
PAINLEVEL_OUTOF10: 3
PAINLEVEL_OUTOF10: 0

## 2021-01-01 ASSESSMENT — PATIENT HEALTH QUESTIONNAIRE - PHQ9
SUM OF ALL RESPONSES TO PHQ9 QUESTIONS 1 & 2: 0
SUM OF ALL RESPONSES TO PHQ QUESTIONS 1-9: 0

## 2021-01-20 NOTE — PROGRESS NOTES
Hyperlipidemia     Hypertension     Irregular heartbeat     Malignant neoplasm of left female breast ([address] 1/11/2016    Osteoarthritis     Unspecified cerebral artery occlusion with cerebral infarction 2004    Valvular heart disease 4/28/2015    Visual disorder     double vision  (patient states she thinks it's her left eye)     Past Surgical History:   Procedure Laterality Date    BLADDER REPAIR      BREAST BIOPSY Right 1992    JOVI HOLE  6/16/15    CARDIOVERSION  11/24/2014    Dr. Viji Thacker  2004    EYE SURGERY Left 2011    cataract   [id number]     Social History     Socioeconomic History    Marital status:     Number of children: 2 daughters, 1 son    Years of education: Not on file   Occupational History    Cook   Tobacco Use    Smoking status: Never Smoker    Smokeless tobacco: Never Used   Substance and Sexual Activity    Alcohol use: No    Drug use: No    Sexual activity: Not Currently   Lifestyle     Family History   Problem Relation Age of Onset    High Blood Pressure Mother     Cancer Mother 80        lung    Cancer Sister 80        colon     PHYSICAL EXAM:   Vitals:    01/21/21 0853   BP: 108/72   Site: Left Wrist   Position: Sitting   Pulse: 73   Resp: 20   Temp: 98.8 °F (37.1 °C)   TempSrc: Infrared   SpO2: 95%   Weight: 136 lb (61.7 kg)   Height: 5' 3\" (1.6 m)     Estimated body mass index is 24.09 kg/m² as calculated from the following:    Height as of this encounter: 5' 3\" (1.6 m). Weight as of this encounter: 136 lb (61.7 kg). GEN:  elderly WDWN female patient seated in recliner chair in NAD. HEAD:  atraumatic, normocephalic. EYES: EOMI, PERRL, no cataracts, conjunctivae appear normal.  ENT:  Poor hearing, bilateral EACs with cerumen impactions, TM's obscurred, nasal septum midline, no significant congestion, oral cavity without lesions, edentulous, full dentures. NECK:  fair-good ROM, no palpable masses, no carotid bruits, no JVD. LUNGS:  clear to ausc, no rales, rhonchi or wheezes. HEART:  RRR, no murmurs or gallops. ABD:  soft, non-tender to palp, no palp masses or HSM, normal BS. BACK:  no scoliosis or kyphosis, non-tender to palp. EXTREMITIES:  Trace-1+ pretibial edema, no ulcerations, varicosities or erythema. No gross deformities. MUSCULOSKELETAL:  Fair-good ROM of all joints, non tender to palp and or with movement. SKIN: No ulcerations or breakdown, rash, ecchymosis, or other lesions. NEURO: No tremor, motor UEs 4-5/5 LEs 3-4/5, sensory normal, unable to stand or walk. PSYCH:  Pleasant and cooperative. Fluid slow speech, oriented to person, place and partial time. No delusional statements. Medications reviewed. 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     Diagnoses attached to this encounter:  Tammy Stout was seen today for hypertension. 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 Encouraged to elevate legs more. Continue Myrbetriq 25mg q HS. Continue tramadol BID and as needed for pain. Fall prevention discussed. Check BMP every 3 months, next in April. Continue other meds as per Rx List. Recheck 1 month. 40 minutes spent on visit, 25 minutes involved education/counseling regarding cardiac, urinary and GI disease processes, treatment options, meds and coordination of care. Current Outpatient Medications   Medication Sig Dispense Refill    polyethylene glycol (GLYCOLAX) 17 GM/SCOOP powder DISSOLVE 17GRAM (1 CAPFUL) IN 4 TO 8OZ OF BEVERAGE AND TAKE BY MOUTH DAILY. **RE-ORDER ACCORDINGLY** 510 g 11    metoprolol tartrate (LOPRESSOR) 25 MG tablet TAKE 1 TABLET BY MOUTH ONCE DAILY 31 tablet 11    mirabegron (MYRBETRIQ) 25 MG TB24 Take 1 tablet by mouth nightly 30 tablet 11    clopidogrel (PLAVIX) 75 MG tablet Take 1 tablet by mouth daily 31 tablet 11    traMADol (ULTRAM) 50 MG tablet Take 1 tablet by mouth 2 times daily for 180 days. 60 tablet 5    hydroCHLOROthiazide (HYDRODIURIL) 25 MG tablet TAKE ONE(1) TABLET BY MOUTH DAILY 31 tablet 10    amLODIPine (NORVASC) 5 MG tablet TAKE ONE(1) TABLET BY MOUTH DAILY 31 tablet 10    amiodarone (CORDARONE) 200 MG tablet TAKE ONE(1) TABLET BY MOUTH ONCE DAILY 31 tablet 10    simvastatin (ZOCOR) 20 MG tablet TAKE ONE TABLET BY MOUTH DAILY 31 tablet 10    losartan (COZAAR) 100 MG tablet TAKE ONE TABLET BY MOUTH DAILY 31 tablet 11    anastrozole (ARIMIDEX) 1 MG tablet TAKE ONE TABLET BY MOUTH DAILY **WOMEN OF CHILD-BEARING AGE SHOULD NOT HANDLE BROKEN OR CRUSHED TABLETS** 31 tablet 11    menthol-zinc oxide (CALMOSEPTINE) 0.44-20.6 % OINT ointment Apply 1 each topically 2 times daily as needed Max 30 ml per day.       Dentifrices (BIOTENE DRY MOUTH CARE DT) Place 1 Container onto teeth 4 times daily as needed      bisacodyl (DULCOLAX) 10 MG suppository Place 10 mg rectally daily as needed for Constipation      senna-docusate (SENEXON-S) 8.6-50 MG per tablet Take 1 tablet by mouth 2 times daily      loratadine (CLARITIN) 10 MG tablet Take 10 mg by mouth daily as needed (allergy sxms)      simethicone (MI-ACID GAS RELIEF) 80 MG chewable tablet Take 80 mg by mouth every 8 hours as needed for Flatulence      carboxymethylcellulose PF (REFRESH PLUS) 0.5 % SOLN ophthalmic solution 1 drop 3 times daily      polyvinyl alcohol (ARTIFICIAL TEARS) 1.4 % ophthalmic solution Place 1 drop into both eyes 3 times daily as needed for Dry Eyes      Artificial Tear Ointment (REFRESH LACRI-LUBE) OINT ointment Apply 1 inch to eye nightly as needed       magnesium oxide (MAG-OX) 400 (240 MG) MG tablet take 1 tablet by mouth once daily (Patient taking differently: Take 400 mg by mouth daily take 1 tablet by mouth once daily) 30 tablet 3    docusate sodium (COLACE) 100 MG capsule Take 200 mg by mouth daily       calcium citrate-vitamin D (CITRICAL + D) 315-250 MG-UNIT TABS per tablet Take 2 tablets by mouth daily       aspirin 81 MG tablet Take 81 mg by mouth daily      magnesium hydroxide (MILK OF MAGNESIA) 400 MG/5ML suspension Take 30 mLs by mouth daily as needed for Constipation      acetaminophen (TYLENOL) 325 MG tablet Take 650 mg by mouth every 4 hours as needed for Pain or Fever       loperamide (IMODIUM) 2 MG capsule Take 2 mg by mouth 4 times daily as needed for Diarrhea       No current facility-administered medications for this visit. Return in about 1 month (around 2/21/2021). An  electronic signature was used to authenticate this note.     --Adwoa Mullen MD on 1/21/2021 at 9:34 AM

## 2021-02-22 NOTE — TELEPHONE ENCOUNTER
Fax from [facility]. Leeanna Odell c/o being SOB and tired upon exertion. Edema present in hands and ankles. /90, P-84, O2 94%.  She is also wheezing

## 2021-02-24 PROBLEM — I50.9 CHRONIC CONGESTIVE HEART FAILURE (HCC): Status: ACTIVE | Noted: 2021-01-01

## 2021-02-24 NOTE — PROGRESS NOTES
2/24/2021    Home visit medically necessary in lieu of an office visit due to: half-way resident, unable to walk, uses wheelchair, difficult to get out. HPI:   Patient says she doing okay today. She was having SOB and wheezing. She also had swelling of feet and hands a few days ago. Since starting Lasix, she is feeling better. She had both COVID vaccine with adverse rxn. She has not had any recent UTI sxms. She has been sleeping well. She moves her bowels once or twice a week, using Miralax. Her appetite has been good. She is unable to walk due to weakness. She continues using a WC to get around. She needs help to stand and transfer. She has had no falls this month. She takes tramadol twice a day for chronic DJD pain. She has no c/o pain except for stiffness in her hands. She has been hard of hearing. REVIEW OF SYSTEMS:    GENERAL: Appetite fair-good. No weight change, generally healthy, DECLINING strength AND exercise tolerance. CARDIOVASCULAR: No chest pains, no murmurs, no palpitations, no syncope, no orthopnea. SWELLING OF LEGS. RESPIRATORY: No shortness of breath, no pain with breathing, no wheezing, no hemoptysis, no cough. GASTROINTESTINAL: No change in appetite, no dysphagia, no heartburn, no abdominal pains, no diarrhea, no bowel habit changes, no emesis, no melena, no hemorrhoids. STRUGGLES WITH CONSTIPATION. GENITOURINARY: No incontinence or retention, no nocturia, no frequent UTIs, no dysuria, no change in nature of urine. OAB, URGENCY. MUSCULOSKELETAL: No swelling or redness of joints, no limitation of range of motion, no numbness. JOINT & MUSCLE PAIN AND WEAKNESS. NEURO/PSYCH: No tremor, no seizures, no changes in mentation, no ataxia. No depressive symptoms, no changes in sleep habits, no changes in thought content. MEMORY LOSS, CONFUSION. All other systems negative.     Allergies   Allergen Reactions    Lopid [Gemfibrozil] Other (See Comments)     Leg weakness     Past Medical History:   Diagnosis Date    Arthritis     Atrial fibrillation ([address]     Chronic congestive heart failure ([address] 2/24/2021    Chronic renal impairment, stage 3 (moderate) 11/1/2017    [place] (hard of hearing)     Hyperlipidemia     Hypertension     Irregular heartbeat     Malignant neoplasm of left female breast ([address] 1/11/2016    Osteoarthritis     Unspecified cerebral artery occlusion with cerebral infarction 2004    Valvular heart disease 4/28/2015    Visual disorder     double vision  (patient states she thinks it's her left eye)     Past Surgical History:   Procedure Laterality Date    BLADDER REPAIR      BREAST BIOPSY Right 1992    JOVI HOLE  6/16/15    CARDIOVERSION  11/24/2014    Dr. Anna Doll  2004    EYE SURGERY Left 2011    cataract   [id number]     Social History     Socioeconomic History    Marital status:     Number of children: 2 daughters, 1 son    Years of education: Not on file   Occupational History    Cook   Tobacco Use    Smoking status: Never Smoker    Smokeless tobacco: Never Used   Substance and Sexual Activity    Alcohol use: No    Drug use: No    Sexual activity: Not Currently   Lifestyle     Family History   Problem Relation Age of Onset    High Blood Pressure Mother     Cancer Mother 80        lung    Cancer Sister 80        colon     PHYSICAL EXAM:   Vitals:    02/24/21 1044   BP: (!) 140/69   Site: Left Wrist   Position: Sitting   Pulse: 67   Resp: 20   Temp: 97.6 °F (36.4 °C)   TempSrc: Temporal   SpO2: 96%   Weight: 136 lb (61.7 kg)   Height: 5' 3\" (1.6 m)     Estimated body mass index is 24.09 kg/m² as calculated from the following:    Height as of this encounter: 5' 3\" (1.6 m). Weight as of this encounter: 136 lb (61.7 kg). GEN:  elderly WDWN female patient seated in recliner chair in NAD. HEAD:  atraumatic, normocephalic.  EYES: EOMI, PERRL, no cataracts, conjunctivae appear normal.  ENT:  Poor hearing, bilateral EACs with cerumen impactions, TM's obscurred, nasal septum midline, no significant congestion, oral cavity without lesions, edentulous, full dentures. NECK:  fair-good ROM, no palpable masses, no carotid bruits, no JVD. LUNGS:  Expiratory wheeze, no rales, rhonchi or wheezes. HEART:  RRR, no murmurs or gallops. ABD:  soft, non-tender to palp, no palp masses or HSM, normal BS. BACK:  no scoliosis or kyphosis, non-tender to palp. EXTREMITIES:  Trace-1+ pretibial edema, no ulcerations, varicosities or erythema. No gross deformities. MUSCULOSKELETAL:  Fair-good ROM of all joints, non tender to palp and or with movement. SKIN: No ulcerations or breakdown, rash, ecchymosis, or other lesions. NEURO: No tremor, motor UEs 4-5/5 LEs 3-4/5, sensory normal, unable to stand or walk. PSYCH:  Pleasant and cooperative. Fluid slow speech, oriented to person, place and partial time. No delusional statements. Medications reviewed. Labs 1/22/21 Na 130, GFR>60  10/6/20 GFR 58, Na 132, K 3.7 8/6/20 HH 12/35, GFR>60, K 3.3, Na 127, LFTs nl, TSH 1.34     ASSESSMENT:  Diagnoses attached to this encounter:  Nick Jiménez was seen today for hypertension, cerumen impaction, leg swelling and shortness of breath. Diagnoses and all orders for this visit:    Chronic congestive heart failure, unspecified heart failure type ([address]    Chronic atrial fibrillation ([address]    Essential hypertension  -     Basic Metabolic Panel; Future    Primary osteoarthritis involving multiple joints    Senile debility    Unable to walk    Bilateral hearing loss due to cerumen impaction    Other orders  -     potassium chloride (KLOR-CON) 10 MEQ extended release tablet; Take 1 tablet by mouth daily  -     metOLazone (ZAROXOLYN) 2.5 MG tablet; Take 1 tablet by mouth three times a week (on [facility] 30 minutes prior to giving Lasix)       PLAN:  Check labs. D/C HCTZ. Start metolazone M-W-F. Start KCl 10 mEq daily.  Cleaned bilateral ear wax impaction using [place] earloop. Tolerated well without pain. TM appears normal. Encouraged to elevate legs more. Continue Myrbetriq 25mg q HS. Continue tramadol BID and as needed for pain. Fall prevention discussed. Check BMP every 3 months, next in May. Continue other meds as per Rx List. Recheck 1 month. 60 minutes spent on visit, 35 minutes involved education/counseling regarding cardiac, ENT and GI disease processes, treatment options, meds and coordination of care. Current Outpatient Medications   Medication Sig Dispense Refill    potassium chloride (KLOR-CON) 10 MEQ extended release tablet Take 1 tablet by mouth daily 60 tablet 3    metOLazone (ZAROXOLYN) 2.5 MG tablet Take 1 tablet by mouth three times a week (on [facility] 30 minutes prior to giving Lasix) 40 tablet 3    furosemide (LASIX) 40 MG tablet Take 1 tablet by mouth daily 30 tablet 5    traMADol (ULTRAM) 50 MG tablet Take 1 tablet by mouth 2 times daily for 180 days. 60 tablet 5    polyethylene glycol (GLYCOLAX) 17 GM/SCOOP powder DISSOLVE 17GRAM (1 CAPFUL) IN 4 TO 8OZ OF BEVERAGE AND TAKE BY MOUTH DAILY.  **RE-ORDER ACCORDINGLY** 510 g 11    metoprolol tartrate (LOPRESSOR) 25 MG tablet TAKE 1 TABLET BY MOUTH ONCE DAILY 31 tablet 11    mirabegron (MYRBETRIQ) 25 MG TB24 Take 1 tablet by mouth nightly 30 tablet 11    clopidogrel (PLAVIX) 75 MG tablet Take 1 tablet by mouth daily 31 tablet 11    amLODIPine (NORVASC) 5 MG tablet TAKE ONE(1) TABLET BY MOUTH DAILY 31 tablet 10    amiodarone (CORDARONE) 200 MG tablet TAKE ONE(1) TABLET BY MOUTH ONCE DAILY 31 tablet 10    simvastatin (ZOCOR) 20 MG tablet TAKE ONE TABLET BY MOUTH DAILY 31 tablet 10    losartan (COZAAR) 100 MG tablet TAKE ONE TABLET BY MOUTH DAILY 31 tablet 11    anastrozole (ARIMIDEX) 1 MG tablet TAKE ONE TABLET BY MOUTH DAILY **WOMEN OF CHILD-BEARING AGE SHOULD NOT HANDLE BROKEN OR CRUSHED TABLETS** 31 tablet 11    menthol-zinc oxide (CALMOSEPTINE) 0.44-20.6 % OINT

## 2021-03-23 NOTE — PROGRESS NOTES
3/23/2021    Home visit medically necessary in lieu of an office visit due to: LORAINE resident, unable to walk, uses wheelchair, difficult to get out. HPI:   Patient says she is not doing that bad. She has been urinating more on metolazone and her swelling is much better. She has not been as SOB either. She has not had any recent UTI sxms. She has been sleeping well. She is moving her bowels once or twice a week with Miralax. Her appetite has been good. She is unable to walk due to weakness and uses a WC to get around. She needs help to stand and transfer. She has had no falls this month. She takes tramadol twice a day for chronic DJD pain. REVIEW OF SYSTEMS:    GENERAL: Appetite fair-good. No weight change, generally healthy, DECLINING strength AND exercise tolerance. CARDIOVASCULAR: No chest pains, no murmurs, no palpitations, no syncope, no orthopnea. SWELLING OF LEGS. RESPIRATORY: No shortness of breath, no pain with breathing, no wheezing, no hemoptysis, no cough. GASTROINTESTINAL: No change in appetite, no dysphagia, no heartburn, no abdominal pains, no diarrhea, no bowel habit changes, no emesis, no melena, no hemorrhoids. STRUGGLES WITH CONSTIPATION. GENITOURINARY: No incontinence or retention, no nocturia, no frequent UTIs, no dysuria, no change in nature of urine. OAB, URGENCY. MUSCULOSKELETAL: No swelling or redness of joints, no limitation of range of motion, no numbness. JOINT & MUSCLE PAIN AND WEAKNESS. NEURO/PSYCH: No tremor, no seizures, no changes in mentation, no ataxia. No depressive symptoms, no changes in sleep habits, no changes in thought content. MEMORY LOSS, CONFUSION. All other systems negative.     Allergies   Allergen Reactions    Lopid [Gemfibrozil] Other (See Comments)     Leg weakness     Past Medical History:   Diagnosis Date    Arthritis     Atrial fibrillation ([address]     Chronic congestive heart failure ([address] 2/24/2021    Chronic renal impairment, stage 3 (moderate) 11/1/2017    [place] (hard of hearing)     Hyperlipidemia     Hypertension     Irregular heartbeat     Malignant neoplasm of left female breast ([address] 1/11/2016    Osteoarthritis     Unspecified cerebral artery occlusion with cerebral infarction 2004    Valvular heart disease 4/28/2015    Visual disorder     double vision  (patient states she thinks it's her left eye)     Past Surgical History:   Procedure Laterality Date    BLADDER REPAIR      BREAST BIOPSY Right 1992    JOVI HOLE  6/16/15    CARDIOVERSION  11/24/2014    Dr. Patty Min  2004    EYE SURGERY Left 2011    cataract   [address]     Social History     Socioeconomic History    Marital status:     Number of children: 2 daughters, 1 son    Years of education: Not on file   Occupational History    Cook   Tobacco Use    Smoking status: Never Smoker    Smokeless tobacco: Never Used   Substance and Sexual Activity    Alcohol use: No    Drug use: No    Sexual activity: Not Currently   Lifestyle     Family History   Problem Relation Age of Onset    High Blood Pressure Mother     Cancer Mother 80        lung    Cancer Sister 80        colon     PHYSICAL EXAM:   Vitals:    03/23/21 1147   BP: (!) 105/56   Site: Left Wrist   Position: Sitting   Pulse: 66   Resp: 20   Temp: 97.6 °F (36.4 °C)   TempSrc: Temporal   SpO2: 96%   Weight: 136 lb (61.7 kg)   Height: 5' 3\" (1.6 m)     Estimated body mass index is 24.09 kg/m² as calculated from the following:    Height as of this encounter: 5' 3\" (1.6 m). Weight as of this encounter: 136 lb (61.7 kg). GEN:  elderly WDWN female patient seated in recliner chair in NAD. HEAD:  atraumatic, normocephalic. EYES: EOMI, PERRL, no cataracts, conjunctivae appear normal.  ENT:  Poor hearing, bilateral EACs with heavy wax, TM's obscurred, nasal septum midline, no significant congestion, oral cavity without lesions, edentulous, full dentures.    NECK:  fair-good ROM, no palpable masses, no carotid bruits, no JVD. LUNGS:  Clear to ausc, no rales, rhonchi or wheezes. HEART:  RRR, no murmurs or gallops. ABD:  soft, non-tender to palp, no palp masses or HSM, normal BS. BACK:  no scoliosis or kyphosis, non-tender to palp. EXTREMITIES:  No pretibial edema, no ulcerations, varicosities or erythema. No gross deformities. MUSCULOSKELETAL:  Fair-good ROM of all joints, non tender to palp and or with movement. SKIN: No ulcerations or breakdown, rash, ecchymosis, or other lesions. NEURO: No tremor, motor UEs 4-5/5 LEs 3-4/5, sensory normal, unable to stand or walk. PSYCH:  Pleasant and cooperative. Fluid slow speech, oriented to person, place and partial time. No delusional statements. Medications reviewed. Labs 2/25/21 GFR 51, Na 132, K 3.2 1/22/21 Na 130, GFR>60  10/6/20 GFR 58, Na 132, K 3.7 8/6/20 HH 12/35, GFR>60, K 3.3, Na 127, LFTs nl, TSH 1.34     ASSESSMENT:  Diagnoses attached to this encounter:  Junette Blizzard was seen today for urinary frequency and hypertension. Diagnoses and all orders for this visit:    Chronic congestive heart failure, unspecified heart failure type ([facility] [address]  -     Basic Metabolic Panel; Future    Chronic atrial fibrillation (HCC)    Essential hypertension    Primary osteoarthritis involving multiple joints    OAB (overactive bladder)    Senile debility    Unable to walk       PLAN:  Check labs. Continue metolazone M-W-F and KCl 10 mEq BID. Encouraged to elevate legs more. Continue Myrbetriq 25mg q HS. Continue tramadol BID and as needed for pain. Fall prevention discussed. Check BMP every 3 months, next in May. Continue other meds as per Rx List. Recheck 1 month. 40 minutes spent on visit, 25 minutes involved education/counseling regarding cardiac, ENT and GI disease processes, treatment options, meds and coordination of care.      Current Outpatient Medications   Medication Sig Dispense Refill    potassium chloride (KLOR-CON) 10 MEQ extended release tablet Take 1 tablet by mouth 2 times daily 180 tablet 3    metOLazone (ZAROXOLYN) 2.5 MG tablet TAKE ONE(1) TABLET BY MOUTH ONCE DAILY MON-WED-FRI 30 MINUTES BEFORE LASIX. 14 tablet 11    furosemide (LASIX) 40 MG tablet Take 1 tablet by mouth daily 30 tablet 5    traMADol (ULTRAM) 50 MG tablet Take 1 tablet by mouth 2 times daily for 180 days. 60 tablet 5    polyethylene glycol (GLYCOLAX) 17 GM/SCOOP powder DISSOLVE 17GRAM (1 CAPFUL) IN 4 TO 8OZ OF BEVERAGE AND TAKE BY MOUTH DAILY. **RE-ORDER ACCORDINGLY** 510 g 11    metoprolol tartrate (LOPRESSOR) 25 MG tablet TAKE 1 TABLET BY MOUTH ONCE DAILY 31 tablet 11    mirabegron (MYRBETRIQ) 25 MG TB24 Take 1 tablet by mouth nightly 30 tablet 11    clopidogrel (PLAVIX) 75 MG tablet Take 1 tablet by mouth daily 31 tablet 11    amLODIPine (NORVASC) 5 MG tablet TAKE ONE(1) TABLET BY MOUTH DAILY 31 tablet 10    amiodarone (CORDARONE) 200 MG tablet TAKE ONE(1) TABLET BY MOUTH ONCE DAILY 31 tablet 10    simvastatin (ZOCOR) 20 MG tablet TAKE ONE TABLET BY MOUTH DAILY 31 tablet 10    losartan (COZAAR) 100 MG tablet TAKE ONE TABLET BY MOUTH DAILY 31 tablet 11    anastrozole (ARIMIDEX) 1 MG tablet TAKE ONE TABLET BY MOUTH DAILY **WOMEN OF CHILD-BEARING AGE SHOULD NOT HANDLE BROKEN OR CRUSHED TABLETS** 31 tablet 11    menthol-zinc oxide (CALMOSEPTINE) 0.44-20.6 % OINT ointment Apply 1 each topically 2 times daily as needed Max 30 ml per day.       Dentifrices (BIOTENE DRY MOUTH CARE DT) Place 1 Container onto teeth 4 times daily as needed      bisacodyl (DULCOLAX) 10 MG suppository Place 10 mg rectally daily as needed for Constipation      senna-docusate (SENEXON-S) 8.6-50 MG per tablet Take 1 tablet by mouth 2 times daily      loratadine (CLARITIN) 10 MG tablet Take 10 mg by mouth daily as needed (allergy sxms)      simethicone (MI-ACID GAS RELIEF) 80 MG chewable tablet Take 80 mg by mouth every 8 hours as needed for Flatulence      carboxymethylcellulose PF (REFRESH PLUS) 0.5 % SOLN ophthalmic solution 1 drop 3 times daily      polyvinyl alcohol (ARTIFICIAL TEARS) 1.4 % ophthalmic solution Place 1 drop into both eyes 3 times daily as needed for Dry Eyes      Artificial Tear Ointment (REFRESH LACRI-LUBE) OINT ointment Apply 1 inch to eye nightly as needed       magnesium oxide (MAG-OX) 400 (240 MG) MG tablet take 1 tablet by mouth once daily (Patient taking differently: Take 400 mg by mouth daily take 1 tablet by mouth once daily) 30 tablet 3    docusate sodium (COLACE) 100 MG capsule Take 200 mg by mouth daily       calcium citrate-vitamin D (CITRICAL + D) 315-250 MG-UNIT TABS per tablet Take 2 tablets by mouth daily       aspirin 81 MG tablet Take 81 mg by mouth daily      magnesium hydroxide (MILK OF MAGNESIA) 400 MG/5ML suspension Take 30 mLs by mouth daily as needed for Constipation      acetaminophen (TYLENOL) 325 MG tablet Take 650 mg by mouth every 4 hours as needed for Pain or Fever       loperamide (IMODIUM) 2 MG capsule Take 2 mg by mouth 4 times daily as needed for Diarrhea       No current facility-administered medications for this visit. Return in about 1 month (around 4/23/2021). An  electronic signature was used to authenticate this note.     --Moni Fletcher MD on 3/23/2021 at 12:48 PM

## 2021-03-26 PROBLEM — D72.829 LEUKOCYTOSIS: Status: ACTIVE | Noted: 2021-01-01

## 2021-03-26 PROBLEM — E87.1 HYPONATREMIA: Status: ACTIVE | Noted: 2021-01-01

## 2021-03-26 PROBLEM — I48.91 ATRIAL FIBRILLATION (HCC): Status: ACTIVE | Noted: 2021-01-01

## 2021-03-26 PROBLEM — I63.9 CEREBELLAR STROKE, ACUTE (HCC): Status: ACTIVE | Noted: 2021-01-01

## 2021-03-26 PROBLEM — I63.9 ACUTE CVA (CEREBROVASCULAR ACCIDENT) (HCC): Status: ACTIVE | Noted: 2021-01-01

## 2021-03-26 PROBLEM — Z85.3 HISTORY OF BREAST CANCER: Status: ACTIVE | Noted: 2021-01-01

## 2021-03-26 NOTE — PROGRESS NOTES
Dr. Dee Osorio advised pt is getting TPA and is going to need Neuro ICU. [facility] called @ 1908 to advise, again, that there are Neuro ICU beds available. Stated Bed Board @ [facility] requests Covid test be completed prior to placement. Advised Dr. Halle Millan, Dr. Dee Osorio, and Charge Nurse/Bill.

## 2021-03-26 NOTE — ED PROVIDER NOTES
Patient is a 51-year-old female presenting from a nursing home after an episode of aphasia. Her last known well was 1650, is associated with hypertension. The history is limited secondary to patient's aphasia. She is able to answer some yes or no questions. An NIH performed on her had a total of 10, she is almost entirely aphasic, but does get out the occasional word sounds slurred. Says nothing hurts. Review of Systems   Unable to perform ROS: Mental status change   HENT: Negative for ear pain. Eyes: Negative for pain. Cardiovascular: Negative for chest pain. Gastrointestinal: Negative for abdominal pain. Genitourinary: Negative for dysuria. Musculoskeletal: Negative for back pain. Neurological: Positive for facial asymmetry, speech difficulty, weakness (Right leg weakness and numbness, left hand weakness) and numbness. Negative for headaches. Physical Exam  Vitals signs and nursing note reviewed. Constitutional:       Appearance: She is ill-appearing. Comments: Patient requires frequent stimulation during the conversation   HENT:      Head: Normocephalic and atraumatic. Right Ear: External ear normal.      Left Ear: External ear normal.      Nose: Nose normal.      Mouth/Throat:      Mouth: Mucous membranes are moist.   Eyes:      Extraocular Movements: Extraocular movements intact. Pupils: Pupils are equal, round, and reactive to light. Neck:      Musculoskeletal: Normal range of motion and neck supple. Cardiovascular:      Rate and Rhythm: Normal rate and regular rhythm. Pulses: Normal pulses. Heart sounds: Normal heart sounds. Pulmonary:      Effort: Pulmonary effort is normal.      Breath sounds: Normal breath sounds. Abdominal:      General: Abdomen is flat. Bowel sounds are normal.      Palpations: Abdomen is soft. Musculoskeletal: Normal range of motion. Skin:     General: Skin is warm and dry.    Neurological:      Cranial Nerves: Dysarthria and facial asymmetry present. Procedures     [facility]     ED Course as of Mar 27 0302   Fri Mar 26, 2021   1746 NIH is 10, sent over to CT    [JG]   1802 Spoke to Dr. Isac Alexander, he did not see any large vessel occlusion when he looked through the CT scans, recommended the patient did get TPA if wanted by the family due to her high NIH score and debilitating aphasia   [JG]   1817 Dr. Edmundo Dykes radiology, did not note any large vessel occlusion or acute changes on any of her CT scans   [JG]   1830  Family deciding whether or not they were to give TPA, patient's blood pressure was 205 systolic, given 20 of labetalol    [JG]   1847 [facility], patient is accepted by Dr. Maretta [place] for the neuro ICU    [JG]   3939 Family still deciding if they were to give TPA or not, blood pressure is improved to 162/73    [JG]   1854 Family wants to give TPA    [JG]   1858 Patient sodium came back at 121, does not explain her focal neurological deficits, potassium 2.9, replacing, also giving a bolus of TPA for patient's sodium being 121, replacing mag as well. [JG]   2005 Evaluated patient, NIH remains around 10, she is still aphasic, but now is able to say words like okay    [JG]   2140 Verma Cranker is here for the patient, remains relatively unchanged, speech is mildly improved able to say a few words    [JG]      ED Course User Index  [JG] Elfida Halsted, MD   Patient presented from nursing home for aphasia with last known well at [phone]. Stroke alert was called, CTs did not show any large vessel occlusion or intracranial bleeding. After speaking to the family due to her debilitating neuro deficits the decision was made to give the patient TPA, she had mild improvement here in the emergency department. Spoke to both telemetry stroke and radiology who did not note any large vessel occlusion, telemetry stroke also recommended the patient get TPA.   She was transferred to the neuro ICU [place] for further management, and monitoring after getting TPA.      --------------------------------------------- PAST HISTORY ---------------------------------------------  Past Medical History:  has a past medical history of Arthritis, Atrial fibrillation ([facility] [address] Chronic congestive heart failure ([facility] [address] Chronic renal impairment, stage 3 (moderate), [place] (hard of hearing), Hyperlipidemia, Hypertension, Irregular heartbeat, Malignant neoplasm of left female breast ([facility] [address] Osteoarthritis, Unspecified cerebral artery occlusion with cerebral infarction, Valvular heart disease, and Visual disorder. Past Surgical History:  has a past surgical history that includes Colonoscopy (2004); Breast biopsy (Right, 1992); Hysterectomy (1994); eye surgery (Left, 2011); bladder repair; Cardioversion (11/24/2014); and [facility] (6/16/15). Social History:  reports that she has never smoked. She has never used smokeless tobacco. She reports that she does not drink alcohol or use drugs. Family History: family history includes Cancer (age of onset: 80) in her sister; Cancer (age of onset: 80) in her mother; High Blood Pressure in her mother. The patients home medications have been reviewed.     Allergies: Lopid [gemfibrozil]    -------------------------------------------------- RESULTS -------------------------------------------------    Lab  Results for orders placed or performed during the hospital encounter of 03/26/21   COVID-19, Rapid    Specimen: Nasopharyngeal Swab   Result Value Ref Range    SARS-CoV-2, NAAT Not Detected Not Detected   CBC Auto Differential   Result Value Ref Range    WBC 14.9 (H) 4.5 - 11.5 E9/L    RBC 3.71 3.50 - 5.50 E12/L    Hemoglobin 11.3 (L) 11.5 - 15.5 g/dL    Hematocrit 32.6 (L) 34.0 - 48.0 %    MCV 87.9 80.0 - 99.9 fL    MCH 30.5 26.0 - 35.0 pg    MCHC 34.7 (H) 32.0 - 34.5 %    RDW 13.2 11.5 - 15.0 fL    Platelets 669 755 - 269 E9/L    MPV 10.0 7.0 - 12.0 fL    Neutrophils % 84.3 (H) 43.0 - 80.0 %    Immature Granulocytes % 0.7 0.0 - 5.0 %    Lymphocytes % 7.6 (L) 20.0 - 42.0 %    Monocytes % 5.8 2.0 - 12.0 %    Eosinophils % 1.3 0.0 - 6.0 %    Basophils % 0.3 0.0 - 2.0 %    Neutrophils Absolute 12.58 (H) 1.80 - 7.30 E9/L    Immature Granulocytes # 0.10 E9/L    Lymphocytes Absolute 1.14 (L) 1.50 - 4.00 E9/L    Monocytes Absolute 0.86 0.10 - 0.95 E9/L    Eosinophils Absolute 0.20 0.05 - 0.50 E9/L    Basophils Absolute 0.04 0.00 - 0.20 E9/L   Comprehensive Metabolic Panel w/ Reflex to MG   Result Value Ref Range    Sodium 121 (L) 132 - 146 mmol/L    Potassium reflex Magnesium 2.9 (L) 3.5 - 5.0 mmol/L    Chloride 81 (L) 98 - 107 mmol/L    CO2 30 (H) 22 - 29 mmol/L    Anion Gap 10 7 - 16 mmol/L    Glucose 169 (H) 74 - 99 mg/dL    BUN 22 8 - 23 mg/dL    CREATININE 0.9 0.5 - 1.0 mg/dL    GFR Non-African American 58 >=60 mL/min/1.73    GFR African American >60     Calcium 9.0 8.6 - 10.2 mg/dL    Total Protein 6.6 6.4 - 8.3 g/dL    Albumin 3.9 3.5 - 5.2 g/dL    Total Bilirubin 0.6 0.0 - 1.2 mg/dL    Alkaline Phosphatase 119 (H) 35 - 104 U/L    ALT 11 0 - 32 U/L    AST 13 0 - 31 U/L   Troponin   Result Value Ref Range    Troponin <0.01 0.00 - 0.03 ng/mL   Protime-INR   Result Value Ref Range    Protime 11.2 9.3 - 12.4 sec    INR 1.0    APTT   Result Value Ref Range    aPTT 26.5 24.5 - 35.1 sec   Magnesium   Result Value Ref Range    Magnesium 1.8 1.6 - 2.6 mg/dL   POCT Glucose   Result Value Ref Range    Meter Glucose 164 (H) 74 - 99 mg/dL   EKG 12 Lead   Result Value Ref Range    Ventricular Rate 67 BPM    Atrial Rate 67 BPM    P-R Interval 344 ms    QRS Duration 102 ms    Q-T Interval 442 ms    QTc Calculation (Bazett) 467 ms    P Axis 65 degrees    R Axis 49 degrees    T Axis 94 degrees       Radiology  XR CHEST PORTABLE   Final Result   Interstitial prominence may be due to a component of edema or underlying   interstitial lung disease.       Perihilar and basilar opacities could be secondary to edema or possibly   infection in the appropriate clinical setting. Trace left effusion. CTA HEAD W CONTRAST   Preliminary Result   1. Ischemic penumbra in the left superior cerebellum and the medial occipital   lobe within the superior cerebellar and posterior cerebral artery territories. 2. Left superior cerebellar artery occlusion. 3. Extensive intracranial atherosclerosis with multifocal severe stenosis of   distal cerebral artery branches, including the left posterior cerebral artery. 4. Bilateral vertebral artery multifocal severe stenosis. 5. Right internal carotid artery 60% stenosis. CTA NECK W CONTRAST   Preliminary Result   1. Ischemic penumbra in the left superior cerebellum and the medial occipital   lobe within the superior cerebellar and posterior cerebral artery territories. 2. Left superior cerebellar artery occlusion. 3. Extensive intracranial atherosclerosis with multifocal severe stenosis of   distal cerebral artery branches, including the left posterior cerebral artery. 4. Bilateral vertebral artery multifocal severe stenosis. 5. Right internal carotid artery 60% stenosis. CT BRAIN PERFUSION   Preliminary Result   1. Ischemic penumbra in the left superior cerebellum and the medial occipital   lobe within the superior cerebellar and posterior cerebral artery territories. 2. Left superior cerebellar artery occlusion. 3. Extensive intracranial atherosclerosis with multifocal severe stenosis of   distal cerebral artery branches, including the left posterior cerebral artery. 4. Bilateral vertebral artery multifocal severe stenosis. 5. Right internal carotid artery 60% stenosis. CT HEAD WO CONTRAST   Final Result   No skull fracture or acute intracranial abnormality. EKG:  This EKG is signed and interpreted by me.     Rate: 67  Rhythm: Sinus and with 1st degree AV block  Interpretation: non-specific EKG  Comparison: no previous EKG available      ------------------------- NURSING NOTES AND VITALS REVIEWED ---------------------------  Date / Time Roomed:  3/26/2021  5:30 PM  ED Bed Assignment:  16/16    The nursing notes within the ED encounter and vital signs as below have been reviewed. Patient Vitals for the past 24 hrs:   BP Temp Temp src Pulse Resp SpO2 Height Weight   03/26/21 2130 (!) 145/61 98.5 °F (36.9 °C) Axillary 63 14 99 %     03/26/21 2115 (!) 125/54   56 14 100 %     03/26/21 2100 134/78   69 14 100 %     03/26/21 2045 (!) 158/68   58 14 93 %     03/26/21 2030 (!) 151/68   61 14 100 %     03/26/21 2015 (!) 151/62   59 14 100 %     03/26/21 2000 (!) 141/58   63 14 97 %     03/26/21 1945 138/67   63 14 96 %     03/26/21 1930 116/61   63 14 97 %     03/26/21 1915 (!) 140/78   68 14 99 %     03/26/21 1913 (!) 140/78   66 14 99 %     03/26/21 1905 (!) 159/72          03/26/21 1903 (!) 159/72   64 14 93 %     03/26/21 1846 (!) 162/73   66 14 96 %     03/26/21 1806 (!) 184/76 98.5 °F (36.9 °C) Axillary 73 14 94 % 5' 3\" (1.6 m) 136 lb (61.7 kg)   03/26/21 1739 (!) 199/82 98.5 °F (36.9 °C) Axillary 75 22 95 %         Oxygen Saturation Interpretation: Normal      ------------------------------------------ PROGRESS NOTES ------------------------------------------  Re-evaluation(s):  Time: 2050. Patients symptoms show no change  Repeat physical examination is not changed, remains aphasic, possible improvement in right upper extremity strength    Time: 2140. Patients symptoms are improving  Repeat physical examination is aphasia somewhat improved, patient is now able to say 1-2 word sentences    I have spoken with the patient and Daughters and discussed todays results, in addition to providing specific details for the plan of care and counseling regarding the diagnosis and prognosis. Their questions are answered at this time and they are agreeable with the plan.   I have

## 2021-03-26 NOTE — ED NOTES
Radiology Procedure Waiver   Name: Parag Tafoya  : 1924  MRN: [id number]    Date:  3/26/21    Time: 5:41 PM EDT    Benefits of immediately proceeding with Radiology exam(s) without pre-testing outweigh the risks or are not indicated as specified below and therefore the following is/are being waived:    [] Pregnancy test   [] Patients LMP on-time and regular.   [] Patient had Tubal Ligation or has other Contraception Device. [] Patient  is Menopausal or Premenarcheal.    [] Patient had Full or Partial Hysterectomy. [] Protocol for Iodine allergy    [] MRI Questionnaire     [x] BUN/Creatinine   [] Patient age w/no hx of renal dysfunction. [] Patient on Dialysis. [] Recent Normal Labs.   Electronically signed by Rubye Krabbe, MD on 3/26/21 at 5:41 PM EDT          Rubye Krabbe, MD PGY-1  3/26/2021 5:42 PM       Cesar Hyde MD  Resident  [phone]

## 2021-03-26 NOTE — PROGRESS NOTES
Dr. Banegas Shall advised he spoke to the [address] @ unrecorded time confirming pt transfer to [place] [address] requested by [place].     Received call from Danyelle Esquivel, on call for Dr. Asaf Beltre, via  OF [facility] @ [phone]. Dr. Maren Rosales spoke to him @ this time.

## 2021-03-26 NOTE — PROGRESS NOTES
[address] @ 7163 to determine if pt has bed @ [address]. [facility], but not yet assigned.

## 2021-03-26 NOTE — PROGRESS NOTES
Stroke Alert was called overhead @ 1740 per Dr. Conner Lanza.     Called 911 to log with  @ [phone]. Per Dr. Conner Lanza, called Telestroke Hotline @ [phone]    . Advised, per Dr. Prieto Hazard:    Cande Pritchett: [phone]  NIH: 9  Advised of presenting symptoms listed in Chief Complaint

## 2021-03-27 NOTE — VIRTUAL HEALTH
[facility] Stroke and Vascular Neurology Consult for  [address] Stroke Alert through [address] @ 550pm 3/26/2021  3/27/2021 12:43 PM    Pt Name: Curt Merlos  MRN: [id number]  YOB: 1924  Date of evaluation: 3/27/2021  Primary Care Physician: Kiel Estes MD  Reason for Evaluation: Stroke evaluation with Phone Consult, Discussion and Review of imaging    79yo female with pmh of deafness, htn, stroke 2004 (details unknown), breat cancer 2016, osteoarthritis, afib not on ac (ICH x2 in the past), vascular heart disease, chf. Pt presents with acute aphasia, LUE weakness, dysarthria, RLE numbness. At baseline pt has no focal deficits. Pt was having dinner with family, LKN 450pm 3/26/2021. Family witnessed acute confusion and inability to speak. In the ed pt was noted to have other symptoms, including dysarthria, LUE weakness, RLE numbness. sbp 190, glu 85. Allergies  is allergic to lopid [gemfibrozil]. Medications  Prior to Admission medications    Medication Sig Start Date End Date Taking? Authorizing Provider   potassium chloride (KLOR-CON) 10 MEQ extended release tablet Take 1 tablet by mouth 3 times daily 3/24/21   Kiel Estes MD   metOLazone (ZAROXOLYN) 2.5 MG tablet TAKE ONE(1) TABLET BY MOUTH ONCE DAILY MON-WED-FRI 30 MINUTES BEFORE LASIX. 2/24/21   Kiel Estes MD   furosemide (LASIX) 40 MG tablet Take 1 tablet by mouth daily 2/22/21   Kiel Estes MD   traMADol (ULTRAM) 50 MG tablet Take 1 tablet by mouth 2 times daily for 180 days. 1/29/21 7/28/21  Kiel Estes MD   polyethylene glycol (GLYCOLAX) 17 GM/SCOOP powder DISSOLVE 17GRAM (1 CAPFUL) IN 4 TO 8OZ OF BEVERAGE AND TAKE BY MOUTH DAILY.  **RE-ORDER ACCORDINGLY** 9/21/20   Kiel Estes MD   metoprolol tartrate (LOPRESSOR) 25 MG tablet TAKE 1 TABLET BY MOUTH ONCE DAILY 9/15/20   Kiel Estes MD   mirabegron (MYRBETRIQ) 25 MG TB24 Take 1 tablet by mouth nightly 9/1/20   Kiel Estes MD   clopidogrel (PLAVIX) 75 MG tablet Take 1 tablet by mouth daily 8/12/20   Madison Lyons MD   amLODIPine (NORVASC) 5 MG tablet TAKE ONE(1) TABLET BY MOUTH DAILY 8/4/20   Madison Lyons MD   amiodarone (CORDARONE) 200 MG tablet TAKE ONE(1) TABLET BY MOUTH ONCE DAILY 8/4/20   Madison Lyons MD   simvastatin (ZOCOR) 20 MG tablet TAKE ONE TABLET BY MOUTH DAILY 8/4/20   Madison Lyons MD   losartan (COZAAR) 100 MG tablet TAKE ONE TABLET BY MOUTH DAILY 8/4/20   Madison Lyons MD   anastrozole (ARIMIDEX) 1 MG tablet TAKE ONE TABLET BY MOUTH DAILY **WOMEN OF CHILD-BEARING AGE SHOULD NOT HANDLE BROKEN OR CRUSHED TABLETS** 8/4/20   Madison Lyons MD   menthol-zinc oxide (CALMOSEPTINE) 0.44-20.6 % OINT ointment Apply 1 each topically 2 times daily as needed Max 30 ml per day.  7/22/19   Historical Provider, MD   Dentifrices (BIOTENE DRY MOUTH CARE DT) Place 1 Container onto teeth 4 times daily as needed 8/27/18   Historical Provider, MD   bisacodyl (DULCOLAX) 10 MG suppository Place 10 mg rectally daily as needed for Constipation 2/21/20   Historical Provider, MD   senna-docusate (SENEXON-S) 8.6-50 MG per tablet Take 1 tablet by mouth 2 times daily 5/12/20   Historical Provider, MD   loratadine (CLARITIN) 10 MG tablet Take 10 mg by mouth daily as needed (allergy sxms) 5/29/18   Historical Provider, MD   simethicone (MI-ACID GAS RELIEF) 80 MG chewable tablet Take 80 mg by mouth every 8 hours as needed for Flatulence 12/27/18   Historical Provider, MD   carboxymethylcellulose PF (REFRESH PLUS) 0.5 % SOLN ophthalmic solution 1 drop 3 times daily 3/27/20   Historical Provider, MD   polyvinyl alcohol (ARTIFICIAL TEARS) 1.4 % ophthalmic solution Place 1 drop into both eyes 3 times daily as needed for Dry Eyes 8/14/15   Historical Provider, MD   Artificial Tear Ointment (REFRESH LACRI-LUBE) OINT ointment Apply 1 inch to eye nightly as needed  8/14/15   Historical Provider, MD   magnesium oxide (MAG-OX) 400 (240 MG) MG tablet take 1 tablet by mouth once daily  Patient taking differently: Take touched    9. Best Language:  2 - severe aphasia; all communication is through fragmentary expression; great need for inference, questioning, and guessing by the listener. Range of information that can be exchanged is limited; listener carries burden of communication. Examiner cannot identify materials provided from patient response. 10. Dysarthria: 1 - mild to moderate, patient slurs at least some words and at worst, can be understood with some difficulty   11. Extinction and Inattention: 0 - no abnormality         Total:   7     Premorbid MRS: 0      Imaging:  Images were personally reviewed with DANNY. AI used to review images including:  CT brain without contrast: no large territory hypodensity or bleed  CTA imaging: no LVO. Diffuse IC and EC athero  CTP: possible L cerebellar increased tmax    Assessment  79yo female with pmh of deafness, htn, stroke 2004 (details unknown), breat cancer 2016, osteoarthritis, afib not on ac ([address] x2 in the past), vascular heart disease, chf. Pt presents with acute aphasia, LUE weakness, dysarthria, RLE numbness. CT/A/P shows no large stroke, diffuse extracranial and intracranial atherosclerosis, but there is possible penumbra in the L cerebellum. Symptoms are nonlocalizing but pt is at high risk of strokes. Recommendations:  --tpa offered, given at 705pm, delayed due to BP control. --no MT, no lvo.  --sbp < 185  --[facility] for post tpa care and workup. Discussed with ED Physician    At least 30 min of Telemedicine and time in conversation directly with ED staff and physician for the patient who is in imminent and life threatening deterioration without further treatment and evaluation. This Virtual Visit was conducted with patient's (and/or legal guardian's) consent, to provide telestroke consultation and necessary medical care.   Time spent examining patient, reviewing the images personally, reviewing the chart, perform high complexity decision making and speaking with the nursing staff regarding recommendations    This is a Phone Consult, I have not seen the patient face to face, the telemedicine device was not utilized.     Chelsie Winchester MD PhD  Stroke, Neurocritical Care And/or [address] [place] [address] Stroke [facility]  Electronically signed 3/27/2021 at 12:43 PM

## 2021-03-27 NOTE — PROGRESS NOTES
[facility]  SURGICAL INTENSIVE CARE UNIT (SICU)  ATTENDING PHYSICIAN CRITICAL CARE PROGRESS NOTE     I have examined the patient, reviewed the record, and discussed the case with the APN/ resident. Please refer to the APN/ resident's note. I agree with the assessment and plan. I have reviewed all relevant labs and imaging data. The following summarizes my clinical findings and independent assessment. CC:  Critical care management after stroke--given t-PA    Hospital Course/Overnight Events:  3/26--presented with acute onset of aphasia--given t-PA  3/27--still with some dysarthria    Pt without complaints. Denies headache.     Awake and alert  Follows commands  Hrt:  Regular  Lungs:  Fairly clear bilaterally  Abd:  Soft; BS active; NT/ND  Skin:  Warm/dry; various areas of ecchymosis  Ext:  Moves all 4 ext    Patient Active Problem List    Diagnosis Date Noted    Vitamin D insufficiency 04/20/2015     Priority: Medium    Acute CVA (cerebrovascular accident) ([address] 03/26/2021    Cerebellar stroke, acute ([address] 03/26/2021    Atrial fibrillation ([address] 03/26/2021    Leukocytosis 03/26/2021    Hyponatremia 03/26/2021    History of breast cancer 03/26/2021    Chronic congestive heart failure ([address] 02/24/2021    OAB (overactive bladder)     Other constipation     Unable to walk     Senile debility     Primary osteoarthritis involving multiple joints     TIA involving right internal carotid artery 07/21/2017    Incontinence in female 06/10/2016    Malignant neoplasm of left female breast ([address] 01/11/2016    History of moreno hole surgery 6/16/15 06/16/2015    Valvular heart disease 04/28/2015    Chronic atrial fibrillation ([address]     Hypokalemia 11/24/2014   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

## 2021-03-27 NOTE — PLAN OF CARE
Problem: Physical Regulation:  Goal: Ability to maintain clinical measurements within normal limits will improve  Outcome: Met This Shift     Problem: Tissue Perfusion:  Goal: Signs of adequate cerebral perfusion will increase  Outcome: Met This Shift  Goal: Ability to maintain intracranial pressure will improve  Outcome: Met This Shift     Problem: HEMODYNAMIC STATUS  Goal: Patient has stable vital signs and fluid balance  Outcome: Met This Shift     Problem: ACTIVITY INTOLERANCE/IMPAIRED MOBILITY  Goal: Mobility/activity is maintained at optimum level for patient  Outcome: Met This Shift     Problem: COMMUNICATION IMPAIRMENT  Goal: Ability to express needs and understand communication  Outcome: Met This Shift     Problem: Physical Regulation:  Goal: Will regain or maintain usual level of consciousness  Outcome: Ongoing

## 2021-03-27 NOTE — PROGRESS NOTES
SPEECH/LANGUAGE PATHOLOGY  SPEECH/LANGUAGE/COGNITIVE EVALUATION      PATIENT NAME:  Ruchi Field      :  1924          TODAY'S DATE:  3/27/2021 ROOM:  [address] [place] [address]     ADMITTING DIAGNOSIS: Acute CVA (cerebrovascular accident) ([facility] [I63.9]    SPEECH PATHOLOGY DIAGNOSIS:    Moderate dysarthria; mild cognitive deficit noted with STM     THERAPY RECOMMENDATIONS:   Speech Pathology intervention is recommended 3-6 times per week for LOS or when goals are met with emphasis on the following:     Immediate/ STM for functional information to complete tasks as instructed and recall pertinent medical information with minimal and/or use of external memory aide training. Speech intelligibility via compensatory strategies and oral motor exercises                MOTOR SPEECH       Oral Peripheral Examination   Right labiobuccal weakness    Parameters of Speech Production  Respiration:  Adequate for speech production  Articulation:  Distortion  Resonance:  Hypernasal  Quality:   Within functional limits  Pitch: Within functional limits  Intensity: Within functional limits  Fluency:  Intact  Prosody Intact    RECEPTIVE LANGUAGE    Comprehension of Yes/No Questions:    Within functional limits    Process  Simple Verbal Commands:   Within functional limits  Process Intermediate Verbal Commands:   Within functional limits  Process Complex Verbal Commands:     Within functional limits    Comprehension of Conversation:      Within functional limits      EXPRESSIVE LANGUAGE     Serials: Functional    Imitation:  Words   Functional   Sentences Not assessed     Naming:  (Modality used:  Verbal and written)  Confrontation Naming  Functional  Functional Description  Functional  Response Naming: Functional    Conversation:      Conversation was within functional limits    COGNITION     Attention/Orientation  Attention: Sustained attention   Orientation:  Oriented to Person, Place, Date, Reason for hospitalization    Memory Immediate Recall: Repeated 3/3    Delayed Recall:   Recalled 2/3    Long Term Recall:   Recalled Birthdate, Age and Family; address not assessed     Organization/Problem Solving/Reasoning   Verbal Sequencing:   Functional        Verbal Problem solving:   Functional          CLINICAL OBSERVATIONS NOTED DURING THE EVALUATION  Latent responses                  The Speech Language Pathologist (SLP) completed education with the patient regarding results of evaluation. Explained that Speech Pathology intervention is warranted  at this time   Prognosis for improvements is fair +     This plan will be re-evaluated and revised in 1 week  if warranted. Patient stated goals: Agreed with above,   Treatment goals discussed with Patient   The Patient understand(s) the diagnosis, prognosis and plan of care       INTERVENTION/EDUCATION    Pt educated on above results and plan of care. Pt encouraged to engage in question/answer session. All questions answered and pt verbalized understanding of above. CPT code:    55549  eval speech sound lang comprehension, Cognitive tx 15 mins      The admitting diagnosis and active problem list, as listed below have been reviewed prior to initiation of this evaluation.         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, posthysterectomy    Essential hypertension    Hypokalemia    Chronic atrial fibrillation (HCC)    Vitamin D insufficiency    Valvular heart disease    History of moreno hole surgery 6/16/15    Malignant neoplasm of left female breast ([address]    Incontinence in female    TIA involving right internal carotid artery    OAB (overactive bladder)    Other constipation    Unable to walk    Senile debility    Primary osteoarthritis involving multiple joints    Chronic congestive heart failure (HCC)    Acute CVA (cerebrovascular accident) ([address]    Cerebellar stroke, acute ([address]    Atrial fibrillation ([facility]    Leukocytosis    Hyponatremia    History of breast cancer     Cynthia CHAMBERLAIN CCC/SLP [id number]  Speech-Language Pathologist

## 2021-03-27 NOTE — PROGRESS NOTES
SPEECH/LANGUAGE PATHOLOGY  CLINICAL ASSESSMENT OF SWALLOWING FUNCTION    PATIENT NAME:  Sol Lewis      :  1924      TODAY'S DATE:  3/27/2021  ROOM:  [address] [place] [address]    SUMMARY OF EVALUATION     DYSPHAGIA DIAGNOSIS:  Oropharyngeal dysphagia      DIET RECOMMENDATIONS:  Dysphagia 3, Soft/advanced (Soft & Bite-sized) solids with  honey consistency (moderately thick) liquids as tolerated     FEEDING RECOMMENDATIONS:     Assistance level:  Full assistance is needed during all oral intake      Compensatory strategies recommended: Small bites/sips and Alternate solids and liquids    THERAPY RECOMMENDATIONS:      Dysphagia therapy is recommended 3-5 times per week for LOS or when goals are met. Pt will complete oral motor strength/ coordination exercises to improve bolus prep/ control and mastication with  moderate verbal prompts . Pt will complete laryngeal strength/ ROM therapeutic exercises to improve airway protection for the least restrictive PO diet with  moderate verbal prompts   Ongoing meal endurance analysis to provide diet modification and compensatory strategy implementation to minimize risk of aspiration associated with PO intake                   PROCEDURE     Consistencies Administered During the Evaluation   Liquids: thin liquid, nectar thick liquid and honey thick liquid   Solids:  pureed foods and solid foods      Method of Intake:   cup, straw, spoon  Self fed, Fed by clinician      Position:   Seated, upright                  RESULTS     Oral Stage:         Decreased mastication due to:  decreased lingual control, Delayed A-P transit due to: reduced lingual strength  and Oral residuals were noted :  throughout the oral cavity      Pharyngeal Stage:      No signs of aspiration were noted during this evaluation for solid, puree or HTL however, silent aspiration cannot be ruled out at bedside.   If silent aspiration is suspected, a Videofluoroscopic Study of Swallowing (MBS) is recommended and requires a physician order. Immediate wet cough was noted after presentation of thin liquid and nectar consistency liquid                  The Speech Language Pathologist (SLP) completed education with the patient regarding results of evaluation. Explained that Speech Pathology intervention is warranted  at this time   Prognosis for improvements is fair +     This plan will be re-evaluated and revised in 1 week  if warranted. Patient stated goals: Agreed with above,   Treatment goals discussed with Patient   The Patient understand(s) the diagnosis, prognosis and plan of care         INTERVENTION/EDUCATION    Pt educated on above results and plan of care. Pt trained on compensatory strategies for safe swallow with good outcome. Pt encouraged to engage in question/answer session. All questions answered and pt verbalized understanding of above. CPT code:  71129  bedside swallow eval, 75993 dysphagia therapy 15 mins      [x]The admitting diagnosis and active problem list, as listed below have been reviewed prior to initiation of this evaluation.      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, posthysterectomy    Essential hypertension    Hypokalemia    Chronic atrial fibrillation (HCC)    Vitamin D insufficiency    Valvular heart disease    History of moreno hole surgery 6/16/15    Malignant neoplasm of left female breast ([address]    Incontinence in female    TIA involving right internal carotid artery    OAB (overactive bladder)    Other constipation    Unable to walk    Senile debility    Primary osteoarthritis involving multiple joints    Chronic congestive heart failure (HCC)    Acute CVA (cerebrovascular accident) ([address]    Cerebellar stroke, acute ([address]    Atrial fibrillation (HCC)    Leukocytosis    Hyponatremia    History of breast cancer

## 2021-03-27 NOTE — CONSULTS
Yani Zuleta is a 80 y.o. female       No chief complaint on file. CC: Stroke  HPI:  59-year-old woman with history of hypertension, previous A. fib although not on anticoagulation subdural hematoma. She presented with sudden onset of dysarthria versus aphasia and inability to speak. CTAs showed extensive atherosclerotic disease but no large vessel occlusion. Possible perfusion deficit in the cerebellar hemisphere. Sodium was 121. Sounds like TPA was discussed extensively with family and ultimately decided to be given. Patient is feeling better this morning although still has dysarthria is worse than baseline. She has significant chronic mobility issues and arthritis of the shoulders but it does appear that her left is weaker than the right which is new. HPI  Prior to Visit Medications    Medication Sig Taking? Authorizing Provider   potassium chloride (KLOR-CON) 10 MEQ extended release tablet Take 1 tablet by mouth 3 times daily  Vena Lexus, MD   metOLazone (ZAROXOLYN) 2.5 MG tablet TAKE ONE(1) TABLET BY MOUTH ONCE DAILY MON-WED-FRI 30 MINUTES BEFORE LASIX. Sophei Alberts, MD   furosemide (LASIX) 40 MG tablet Take 1 tablet by mouth daily  Vena Levels, MD   traMADol (ULTRAM) 50 MG tablet Take 1 tablet by mouth 2 times daily for 180 days. Sophie Alberts, MD   polyethylene glycol (GLYCOLAX) 17 GM/SCOOP powder DISSOLVE 17GRAM (1 CAPFUL) IN 4 TO 8OZ OF BEVERAGE AND TAKE BY MOUTH DAILY.  **RE-ORDER ACCORDINGLY**  Sophie Alberts, MD   metoprolol tartrate (LOPRESSOR) 25 MG tablet TAKE 1 TABLET BY MOUTH ONCE DAILY  Vena Levels, MD   mirabegron (MYRBETRIQ) 25 MG TB24 Take 1 tablet by mouth nightly  Vena Levels, MD   clopidogrel (PLAVIX) 75 MG tablet Take 1 tablet by mouth daily  Vena Levels, MD   amLODIPine (NORVASC) 5 MG tablet TAKE ONE(1) TABLET BY MOUTH DAILY  Vena Levels, MD   amiodarone (CORDARONE) 200 MG tablet TAKE ONE(1) TABLET BY MOUTH ONCE DAILY  Vena Levels, MD   simvastatin (ZOCOR) 20 MG tablet TAKE ONE TABLET BY MOUTH Leslie Fuentes MD   losartan (COZAAR) 100 MG tablet TAKE ONE TABLET BY MOUTH DAILY  Jonna Peters MD   anastrozole (ARIMIDEX) 1 MG tablet TAKE ONE TABLET BY MOUTH DAILY **WOMEN OF CHILD-BEARING AGE SHOULD NOT HANDLE BROKEN OR CRUSHED TABLETS**  Jonna Peters MD   menthol-zinc oxide (CALMOSEPTINE) 0.44-20.6 % OINT ointment Apply 1 each topically 2 times daily as needed Max 30 ml per day.   Historical Provider, MD   Dentifrices (BIOTENE DRY MOUTH CARE DT) Place 1 Container onto teeth 4 times daily as needed  Historical Provider, MD   bisacodyl (DULCOLAX) 10 MG suppository Place 10 mg rectally daily as needed for Constipation  Historical Provider, MD   senna-docusate (SENEXON-S) 8.6-50 MG per tablet Take 1 tablet by mouth 2 times daily  Historical Provider, MD   loratadine (CLARITIN) 10 MG tablet Take 10 mg by mouth daily as needed (allergy sxms)  Historical Provider, MD   simethicone (MI-ACID GAS RELIEF) 80 MG chewable tablet Take 80 mg by mouth every 8 hours as needed for Flatulence  Historical Provider, MD   carboxymethylcellulose PF (REFRESH PLUS) 0.5 % SOLN ophthalmic solution 1 drop 3 times daily  Historical Provider, MD   polyvinyl alcohol (ARTIFICIAL TEARS) 1.4 % ophthalmic solution Place 1 drop into both eyes 3 times daily as needed for Dry Eyes  Historical Provider, MD   Artificial Tear Ointment (REFRESH LACRI-LUBE) OINT ointment Apply 1 inch to eye nightly as needed   Historical Provider, MD   magnesium oxide (MAG-OX) 400 (240 MG) MG tablet take 1 tablet by mouth once daily  Patient taking differently: Take 400 mg by mouth daily take 1 tablet by mouth once daily  Gabino Hollingsworth MD   docusate sodium (COLACE) 100 MG capsule Take 200 mg by mouth daily   Historical Provider, MD   calcium citrate-vitamin D (CITRICAL + D) 315-250 MG-UNIT TABS per tablet Take 2 tablets by mouth daily   Historical Provider, MD   aspirin 81 MG tablet Take 81 mg by mouth daily  Historical Provider, MD   magnesium hydroxide (MILK OF MAGNESIA) 400 MG/5ML suspension Take 30 mLs by mouth daily as needed for Constipation  Historical Provider, MD   acetaminophen (TYLENOL) 325 MG tablet Take 650 mg by mouth every 4 hours as needed for Pain or Fever   Historical Provider, MD   loperamide (IMODIUM) 2 MG capsule Take 2 mg by mouth 4 times daily as needed for Diarrhea  Historical Provider, MD     Social History     Tobacco Use    Smoking status: Never Smoker    Smokeless tobacco: Never Used   Substance Use Topics    Alcohol use: No    Drug use: No     Family History   Problem Relation Age of Onset    High Blood Pressure Mother     Cancer Mother 80        lung    Cancer Sister 80        colon     Past Surgical History:   Procedure Laterality Date    BLADDER REPAIR      BREAST BIOPSY Right 1992    JOVI HOLE  6/16/15    CARDIOVERSION  11/24/2014    Dr. Consuelo Barron  2004    EYE SURGERY Left 2011    cataract   [id number]     Past Medical History:   Diagnosis Date    Arthritis     [facility] [address]     Chronic congestive heart failure ([facility] [address] 2/24/2021    Chronic renal impairment, stage 3 (moderate) 11/1/2017    [place] (hard of hearing)     Hyperlipidemia     Hypertension     Irregular heartbeat     Malignant neoplasm of left female breast ([facility] [address] 1/11/2016    Osteoarthritis     Unspecified cerebral artery occlusion with cerebral infarction 2004    Valvular heart disease 4/28/2015    Visual disorder     double vision  (patient states she thinks it's her left eye)     Review of Systems     As stated above all others negative. Objective:   BP (!) 156/58   Pulse 67   Temp 97.6 °F (36.4 °C) (Temporal)   Resp 22   Ht 5' 3\" (1.6 m)   Wt 136 lb (61.7 kg)   LMP  (LMP Unknown)   SpO2 97%   BMI 24.09 kg/m²     Physical Exam  Constitutional:       General: She is not in acute distress. Appearance: She is not ill-appearing. HENT:      Head: Normocephalic and atraumatic.       Mouth/Throat:      Mouth: 0.0 08/06/2020    LYMPHOPCT 6.3 03/27/2021    LYMPHOPCT 18.2 08/06/2020    MONOPCT 5.1 03/27/2021    BASOPCT 0.2 03/27/2021    MONOSABS 0.62 03/27/2021    LYMPHSABS 0.77 03/27/2021    EOSABS 0.01 03/27/2021    BASOSABS 0.02 03/27/2021     CMP:    Lab Results   Component Value Date     03/27/2021    K 3.7 03/27/2021    K 2.9 03/26/2021    CL 81 03/27/2021    CO2 29 03/27/2021    BUN 19 03/27/2021    CREATININE 0.8 03/27/2021    GFRAA >60 03/27/2021    LABGLOM >60 03/27/2021    LABGLOM 45 03/24/2021    GLUCOSE 123 03/27/2021    GLUCOSE 85 03/24/2021    PROT 6.8 03/27/2021    LABALBU 4.2 03/27/2021    LABALBU 3.5 08/06/2020    CALCIUM 9.2 03/27/2021    BILITOT 0.6 03/27/2021    BILITOT Negative 11/19/2020    ALKPHOS 118 03/27/2021    AST 14 03/27/2021    ALT 12 03/27/2021       CT head:  1. Ischemic penumbra in the left superior cerebellum and the medial occipital   lobe within the superior cerebellar and posterior cerebral artery territories. 2. Left superior cerebellar artery occlusion. 3. Extensive intracranial atherosclerosis with multifocal severe stenosis of   distal cerebral artery branches, including the left posterior cerebral artery. 4. Bilateral vertebral artery multifocal severe stenosis. 5. Right internal carotid artery 60% stenosis.             I independently reviewed the labs and imaging studies at today's appointment. Assessment:     Embolic appearing stroke to the likely to the left cerebellar hemisphere. Given her extensive atherosclerotic disease this is more likely an artery artery embolus my opinion however with her history of A. fib on anticoagulation.   Discussed treatment options        Plan:     MRI of the brain  Continue post TPA protocol  PT OT speech therapy    Miguelina De Los Santos  10:18 AM  3/27/2021

## 2021-03-27 NOTE — CONSULTS
Palliative Care Department  [phone]  Palliative Care Initial Consult  Provider Isis Morales MD      PATIENT: Ender Bear  : 1924  MRN: [id number]  ADMISSION DATE: 3/26/2021 10:43 PM  Referring Provider: Nerissa Washington was consulted on hospital day 1 for assistance with Goals of care    HPI:     Clinical Summary:Idalia Shields is a 80 y.o. y/o female with a history of hypertension, previous A. fib although not on anticoagulation subdural hematoma who presented to [facility] on 3/26/2021 with sudden onset of dysphagia. CTA showed extensive atherosclerosis. She was given TPA. ASSESSMENT/PLAN:     Pertinent Hospital Diagnoses      Stroke      Palliative Care Encounter / Counseling Regarding Goals of Care  Please see detailed goals of care discussion as below   At this time, Ender Bear, Does have capacity for medical decision-making. Capacity is time limited and situation/question specific   During encounter St. diaz was surrogate medical decision-maker   Outcome of goals of care meeting: I spoke with Jesus Kulkarni about her advanced directives, also with Ann. She was previously a DNR CCA this was last year. Currently St. diaz and Olimpiaomar Kulkarni want her to be full code. If anything changes, they will let us know.  Code status Full Code   Advanced Directives: no POA or living will in [facility]   Surrogate/Legal NOK: Junior Felipe, [phone]    Thank you for the opportunity to participate in the care of Ender Bear. Isis Morales MD  Palliative Medicine     SUBJECTIVE:     Details of Conversation: Spoke with Jesus Kulkarni, and RN at bedside. Jesus Kulkarni still has some dysphagia while speaking with her. She does know what year it is, month with the president is where she is at. I explained my role of palliative medicine, along with asking her about advanced directives.   She told me I will have to speak with her daughter St. diaz, as I was walking out she was walking in. Spoke to St. diaz about her mother's condition, she works as a PACU nurse. She knows very well about the condition and her mother is currently in, she is watching her and speaking with physicians. At this time she wants to continue with full code, if anything changes she is very realistic. She will make the decision at that time. Prognosis: Guarded    OBJECTIVE:     BP (!) 156/58   Pulse 67   Temp 97.6 °F (36.4 °C) (Temporal)   Resp 22   Ht 5' 3\" (1.6 m)   Wt 136 lb (61.7 kg)   LMP  (LMP Unknown)   SpO2 97%   BMI 24.09 kg/m²     Physical Examination:  Gen: elderly, thin, awake, somewhat garbled speech  HEENT: normocephalic, atraumatic,  Neck: trachea midline, no JVD  Lungs: respirations easy and not labored,   Heart: regular rate and rhythm, distant heart tones,   Abdomen: soft,   Extremities: no clubbing,   Skin: warm,   Neuro: awake, alert,     Objective data reviewed: labs, images, records, medication use, vitals and chart    Time/Communication  Greater than 50% of time spent, total 45 minutes in counseling and coordination of care at the bedside regarding goals of care. Thank you for allowing Palliative Medicine to participate in the care of [facility]. Note: This report was completed using computerize voiced recognition software. Every effort has been made to ensure accuracy; however, inadvertent computerized transcription errors may be present.

## 2021-03-27 NOTE — CONSULTS
powder DISSOLVE 17GRAM (1 CAPFUL) IN 4 TO 8OZ OF BEVERAGE AND TAKE BY MOUTH DAILY. **RE-ORDER ACCORDINGLY** 9/21/20   Karolyn Gómez MD   metoprolol tartrate (LOPRESSOR) 25 MG tablet TAKE 1 TABLET BY MOUTH ONCE DAILY 9/15/20   Karolyn Gómez MD   mirabegron (MYRBETRIQ) 25 MG TB24 Take 1 tablet by mouth nightly 9/1/20   Karolyn Gómez MD   clopidogrel (PLAVIX) 75 MG tablet Take 1 tablet by mouth daily 8/12/20   Karolyn Gómez MD   amLODIPine (NORVASC) 5 MG tablet TAKE ONE(1) TABLET BY MOUTH DAILY 8/4/20   Karolyn Gómez MD   amiodarone (CORDARONE) 200 MG tablet TAKE ONE(1) TABLET BY MOUTH ONCE DAILY 8/4/20   Karolyn Gómez MD   simvastatin (ZOCOR) 20 MG tablet TAKE ONE TABLET BY MOUTH DAILY 8/4/20   Karolyn Gómez MD   losartan (COZAAR) 100 MG tablet TAKE ONE TABLET BY MOUTH DAILY 8/4/20   Karolyn Gómez MD   anastrozole (ARIMIDEX) 1 MG tablet TAKE ONE TABLET BY MOUTH DAILY **WOMEN OF CHILD-BEARING AGE SHOULD NOT HANDLE BROKEN OR CRUSHED TABLETS** 8/4/20   Karolyn Gómez MD   menthol-zinc oxide (CALMOSEPTINE) 0.44-20.6 % OINT ointment Apply 1 each topically 2 times daily as needed Max 30 ml per day.  7/22/19   Historical Provider, MD   Dentifrices (BIOTENE DRY MOUTH CARE DT) Place 1 Container onto teeth 4 times daily as needed 8/27/18   Historical Provider, MD   bisacodyl (DULCOLAX) 10 MG suppository Place 10 mg rectally daily as needed for Constipation 2/21/20   Historical Provider, MD   senna-docusate (SENEXON-S) 8.6-50 MG per tablet Take 1 tablet by mouth 2 times daily 5/12/20   Historical Provider, MD   loratadine (CLARITIN) 10 MG tablet Take 10 mg by mouth daily as needed (allergy sxms) 5/29/18   Historical Provider, MD   simethicone (MI-ACID GAS RELIEF) 80 MG chewable tablet Take 80 mg by mouth every 8 hours as needed for Flatulence 12/27/18   Historical Provider, MD   carboxymethylcellulose PF (REFRESH PLUS) 0.5 % SOLN ophthalmic solution 1 drop 3 times daily 3/27/20   Historical Provider, MD   polyvinyl alcohol (ARTIFICIAL TEARS) 1.4 % PERRLA, EOMI, pupils are 3 mm laterally  Lungs: Symmetric chest rise, no audible wheezes, on room air  Heart: normocardic, slightly hypertensive to the 743 systolic  Abdomen: Soft nondistended  Extremities: Warm peripherally  Neuro: She has facial asymmetry (right side), dysarthria, she moves all 4 extremities and she has 5 out of 5 strength throughout, sensation intact to light touch throughout    LABS:  CBC  Recent Labs     03/26/21  1809   WBC 14.9*   HGB 11.3*   HCT 32.6*        BMP  Recent Labs     03/26/21  1809   *   K 2.9*   CL 81*   CO2 30*   BUN 22   CREATININE 0.9   CALCIUM 9.0     Liver Function  Recent Labs     03/26/21  1809   BILITOT 0.6   AST 13   ALT 11   ALKPHOS 119*   PROT 6.6   LABALBU 3.9     No results for input(s): LACTATE in the last 72 hours. Recent Labs     03/26/21  1809   INR 1.0       RADIOLOGY  Ct Head Wo Contrast    Result Date: 3/26/2021  EXAMINATION: CT OF THE HEAD WITHOUT CONTRAST  3/26/2021 5:45 pm TECHNIQUE: CT of the head was performed without the administration of intravenous contrast. Dose modulation, iterative reconstruction, and/or weight based adjustment of the mA/kV was utilized to reduce the radiation dose to as low as reasonably achievable. COMPARISON: CT head without contrast, July 20, 2017 HISTORY: ORDERING SYSTEM PROVIDED HISTORY: Trauma TECHNOLOGIST PROVIDED HISTORY: Has a \"code stroke\" or \"stroke alert\" been called? ->Yes Reason for exam:->Trauma Decision Support Exception->Emergency Medical Condition (MA) FINDINGS: BRAIN/VENTRICLES: No mass effect, edema or hemorrhage is seen. Moderate volume loss is seen in the brain. Chronic lacunar infarctions are seen in the bilateral basal ganglia and left thalamus, as well as the right corona radiata. Moderate chronic microvascular ischemic changes are seen. Insert no hydrocele ORBITS: The visualized portion of the orbits demonstrate no acute abnormality.  SINUSES: The visualized paranasal sinuses and mastoid air cells demonstrate no acute abnormality. SOFT TISSUES/SKULL:  Katheen Gut holes are seen in the right frontal and parietal bones. The calvarium is otherwise unremarkable. No evidence of acute fracture. No skull fracture or acute intracranial abnormality. Xr Chest Portable    Result Date: 3/26/2021  EXAMINATION: ONE XRAY VIEW OF THE CHEST 3/26/2021 2:47 pm COMPARISON: 12/01/2017, 07/20/2017 HISTORY: ORDERING SYSTEM PROVIDED HISTORY: stroke like symptoms TECHNOLOGIST PROVIDED HISTORY: Reason for exam:->stroke like symptoms FINDINGS: Hypoventilated lungs with interstitial prominence and perihilar and left basilar opacities. Trace left effusion. Right costophrenic angle is obscured by overlying external monitoring leads. No pneumothorax. Cardiomediastinal contours are prominent but unchanged. Advanced degenerative changes without acute bony abnormality. Interstitial prominence may be due to a component of edema or underlying interstitial lung disease. Perihilar and basilar opacities could be secondary to edema or possibly infection in the appropriate clinical setting. Trace left effusion. Cta Neck W Contrast    Result Date: 3/26/2021  EXAMINATION: CTA OF THE HEAD WITH CONTRAST; CTA OF THE HEAD WITH CONTRAST WITH PERFUSION; CTA OF THE NECK 3/26/2021 5:52 pm: TECHNIQUE: CTA of the head/brain was performed with the administration of intravenous contrast. Multiplanar reformatted images are provided for review. MIP images are provided for review. Dose modulation, iterative reconstruction, and/or weight based adjustment of the mA/kV was utilized to reduce the radiation dose to as low as reasonably achievable.; CTA of the neck was performed with the administration of intravenous contrast. Multiplanar reformatted images are provided for review. MIP images are provided for review. Stenosis of the internal carotid arteries measured using NASCET criteria.  Dose modulation, iterative reconstruction, and/or weight based adjustment of the mA/kV was utilized to reduce the radiation dose to as low as reasonably achievable. COMPARISON: None. HISTORY: ORDERING SYSTEM PROVIDED HISTORY: stroke TECHNOLOGIST PROVIDED HISTORY: Reason for exam:->stroke Has a \"code stroke\" or \"stroke alert\" been called? ->Yes Decision Support Exception->Emergency Medical Condition (MA); ORDERING SYSTEM PROVIDED HISTORY: stroke TECHNOLOGIST PROVIDED HISTORY: Reason for exam:->stroke Decision Support Exception->Emergency Medical Condition (MA) FINDINGS: CTA NECK: AORTIC ARCH/ARCH VESSELS: No dissection or arterial injury. No significant stenosis of the brachiocephalic or subclavian arteries. CAROTID ARTERIES: The common carotid arteries are patent. Calcified plaque causes 40% stenosis of the proximal left internal carotid artery. Calcified plaque causes 60% stenosis of the proximal right internal carotid artery. VERTEBRAL ARTERIES: Right vertebral artery severe stenosis at the origin, moderate stenosis at the V2 segment, and moderate stenosis at the V3/V4 junction. Left vertebral artery severe stenosis of the origin, moderate stenosis at the P1 segment, severe stenosis at the V4 segment. SOFT TISSUES: The lung apices are clear. No cervical or superior mediastinal lymphadenopathy. The larynx and pharynx are unremarkable. No acute abnormality of the salivary and thyroid glands. BONES: There are right frontal and parietal craniotomy moreno holes. Severe cervical spondylosis without spinal canal stenosis. CTA HEAD: ANTERIOR CIRCULATION: Severe stenosis of the right anterior cerebral artery A3 segment throughout the pericallosal artery. The proximal portions of anterior cerebral arteries are patent. Multifocal moderate to severe stenoses of the distal branches of the middle cerebral arteries. The M1 segments are patent. There is only mild stenosis of the left M1 segment. POSTERIOR CIRCULATION: The right posteroinferior cerebral artery is patent.  The superior mediastinal lymphadenopathy. The larynx and pharynx are unremarkable. No acute abnormality of the salivary and thyroid glands. BONES: There are right frontal and parietal craniotomy moreno holes. Severe cervical spondylosis without spinal canal stenosis. CTA HEAD: ANTERIOR CIRCULATION: Severe stenosis of the right anterior cerebral artery A3 segment throughout the pericallosal artery. The proximal portions of anterior cerebral arteries are patent. Multifocal moderate to severe stenoses of the distal branches of the middle cerebral arteries. The M1 segments are patent. There is only mild stenosis of the left M1 segment. POSTERIOR CIRCULATION: The right posteroinferior cerebral artery is patent. The proximal portion of the left posteroinferior cerebral artery is patent, but the distal branches are not well opacified which could be due to small caliber or stenosis. There is minimal opacification of the left anterior inferior cerebellar artery at its proximal segment, which is irregular. The left superior cerebellar artery is occluded just distal to its origin. There is severe stenosis at the origin of the right superior cerebellar artery. There is severe stenosis of the left posterior cerebral artery at the P3 segment and more distal branches. There is multifocal stenosis of the distal branches of the right posterior cerebral artery. The basilar artery is patent. OTHER: No dural venous sinus thrombosis on this non-dedicated study. BRAIN: See noncontrast head CT. CT PERFUSION: EXAM QUALITY: The examination is diagnostic with appropriate arterial inflow and venous outflow curves, and diagnostic perfusion maps. CORE INFARCT: The total area of ischemic core is 2 mL (CBF<30% volume). TOTAL HYPOPERFUSION: The total area of hypoperfusion is 35 mL (Tmax>6s volume). PENUMBRA: The penumbra (mismatch) volume is 33 mL. The mismatch ratio is 17.5.  The ischemic area is predominantly within the left cerebellar hemisphere in the superior cerebellar artery territory. However there is also ischemia within the medial aspect of the left occipital lobe. 1. Ischemic penumbra in the left superior cerebellum and the medial occipital lobe within the superior cerebellar and posterior cerebral artery territories. 2. Left superior cerebellar artery occlusion. 3. Extensive intracranial atherosclerosis with multifocal severe stenosis of distal cerebral artery branches, including the left posterior cerebral artery. 4. Bilateral vertebral artery multifocal severe stenosis. 5. Right internal carotid artery 60% stenosis. Cta Head W Contrast    Result Date: 3/26/2021  EXAMINATION: CTA OF THE HEAD WITH CONTRAST; CTA OF THE HEAD WITH CONTRAST WITH PERFUSION; CTA OF THE NECK 3/26/2021 5:52 pm: TECHNIQUE: CTA of the head/brain was performed with the administration of intravenous contrast. Multiplanar reformatted images are provided for review. MIP images are provided for review. Dose modulation, iterative reconstruction, and/or weight based adjustment of the mA/kV was utilized to reduce the radiation dose to as low as reasonably achievable.; CTA of the neck was performed with the administration of intravenous contrast. Multiplanar reformatted images are provided for review. MIP images are provided for review. Stenosis of the internal carotid arteries measured using NASCET criteria. Dose modulation, iterative reconstruction, and/or weight based adjustment of the mA/kV was utilized to reduce the radiation dose to as low as reasonably achievable. COMPARISON: None. HISTORY: ORDERING SYSTEM PROVIDED HISTORY: stroke TECHNOLOGIST PROVIDED HISTORY: Reason for exam:->stroke Has a \"code stroke\" or \"stroke alert\" been called? ->Yes Decision Support Exception->Emergency Medical Condition (MA); ORDERING SYSTEM PROVIDED HISTORY: stroke TECHNOLOGIST PROVIDED HISTORY: Reason for exam:->stroke Decision Support Exception->Emergency Medical Condition (MA) FINDINGS: CTA NECK: AORTIC ARCH/ARCH VESSELS: No dissection or arterial injury. No significant stenosis of the brachiocephalic or subclavian arteries. CAROTID ARTERIES: The common carotid arteries are patent. Calcified plaque causes 40% stenosis of the proximal left internal carotid artery. Calcified plaque causes 60% stenosis of the proximal right internal carotid artery. VERTEBRAL ARTERIES: Right vertebral artery severe stenosis at the origin, moderate stenosis at the V2 segment, and moderate stenosis at the V3/V4 junction. Left vertebral artery severe stenosis of the origin, moderate stenosis at the P1 segment, severe stenosis at the V4 segment. SOFT TISSUES: The lung apices are clear. No cervical or superior mediastinal lymphadenopathy. The larynx and pharynx are unremarkable. No acute abnormality of the salivary and thyroid glands. BONES: There are right frontal and parietal craniotomy moreno holes. Severe cervical spondylosis without spinal canal stenosis. CTA HEAD: ANTERIOR CIRCULATION: Severe stenosis of the right anterior cerebral artery A3 segment throughout the pericallosal artery. The proximal portions of anterior cerebral arteries are patent. Multifocal moderate to severe stenoses of the distal branches of the middle cerebral arteries. The M1 segments are patent. There is only mild stenosis of the left M1 segment. POSTERIOR CIRCULATION: The right posteroinferior cerebral artery is patent. The proximal portion of the left posteroinferior cerebral artery is patent, but the distal branches are not well opacified which could be due to small caliber or stenosis. There is minimal opacification of the left anterior inferior cerebellar artery at its proximal segment, which is irregular. The left superior cerebellar artery is occluded just distal to its origin. There is severe stenosis at the origin of the right superior cerebellar artery.  There is severe stenosis of the left posterior cerebral

## 2021-03-27 NOTE — H&P
[address] [facility]  History and Physical      CHIEF COMPLAINT:  Strokelike symptoms        HISTORY OF PRESENT ILLNESS:      The patient is a 80 y.o. female patient of Dr. Edmon Halsted fall history of A. fib, CHF, CKD, hypertension who presents with strokelike symptoms. Patient to [address] speech difficulty. Patient with reported aphasia. NIH of 10 in ED. Telestroke consult obtained. Patient was treated with TPA. Pt is a poor historian. History is largely obtained from chart review and discussions with staff/family as available. Patient admitted to ICU for further evaluation and treatment. Past Medical History:    Past Medical History:   Diagnosis Date    Arthritis     Atrial fibrillation ([address]     Chronic congestive heart failure ([address] 2/24/2021    Chronic renal impairment, stage 3 (moderate) 11/1/2017    [place] (hard of hearing)     Hyperlipidemia     Hypertension     Irregular heartbeat     Malignant neoplasm of left female breast ([address] 1/11/2016    Osteoarthritis     Unspecified cerebral artery occlusion with cerebral infarction 2004    Valvular heart disease 4/28/2015    Visual disorder     double vision  (patient states she thinks it's her left eye)       Past Surgical History:    Past Surgical History:   Procedure Laterality Date    BLADDER REPAIR      BREAST BIOPSY Right 1992    JOVI HOLE  6/16/15    CARDIOVERSION  11/24/2014    Dr. Va Perez  2004    EYE SURGERY Left 2011    cataract   [address]       Medications Prior to Admission:    Medications Prior to Admission: potassium chloride (KLOR-CON) 10 MEQ extended release tablet, Take 1 tablet by mouth 3 times daily  metOLazone (ZAROXOLYN) 2.5 MG tablet, TAKE ONE(1) TABLET BY MOUTH ONCE DAILY MON-WED-FRI 30 MINUTES BEFORE LASIX.  furosemide (LASIX) 40 MG tablet, Take 1 tablet by mouth daily  traMADol (ULTRAM) 50 MG tablet, Take 1 tablet by mouth 2 times daily for 180 days.   polyethylene glycol (GLYCOLAX) 17 GM/SCOOP powder, DISSOLVE 17GRAM (1 CAPFUL) IN 4 TO 8OZ OF BEVERAGE AND TAKE BY MOUTH DAILY. **RE-ORDER ACCORDINGLY**  metoprolol tartrate (LOPRESSOR) 25 MG tablet, TAKE 1 TABLET BY MOUTH ONCE DAILY  mirabegron (MYRBETRIQ) 25 MG TB24, Take 1 tablet by mouth nightly  clopidogrel (PLAVIX) 75 MG tablet, Take 1 tablet by mouth daily  amLODIPine (NORVASC) 5 MG tablet, TAKE ONE(1) TABLET BY MOUTH DAILY  amiodarone (CORDARONE) 200 MG tablet, TAKE ONE(1) TABLET BY MOUTH ONCE DAILY  simvastatin (ZOCOR) 20 MG tablet, TAKE ONE TABLET BY MOUTH DAILY  losartan (COZAAR) 100 MG tablet, TAKE ONE TABLET BY MOUTH DAILY  anastrozole (ARIMIDEX) 1 MG tablet, TAKE ONE TABLET BY MOUTH DAILY **WOMEN OF CHILD-BEARING AGE SHOULD NOT HANDLE BROKEN OR CRUSHED TABLETS**  menthol-zinc oxide (CALMOSEPTINE) 0.44-20.6 % OINT ointment, Apply 1 each topically 2 times daily as needed Max 30 ml per day.   Dentifrices ([employer] DRY MOUTH CARE DT), Place 1 Container onto teeth 4 times daily as needed  bisacodyl (DULCOLAX) 10 MG suppository, Place 10 mg rectally daily as needed for Constipation  senna-docusate (SENEXON-S) 8.6-50 MG per tablet, Take 1 tablet by mouth 2 times daily  loratadine (CLARITIN) 10 MG tablet, Take 10 mg by mouth daily as needed (allergy sxms)  simethicone (MI-ACID GAS RELIEF) 80 MG chewable tablet, Take 80 mg by mouth every 8 hours as needed for Flatulence  carboxymethylcellulose PF (REFRESH PLUS) 0.5 % SOLN ophthalmic solution, 1 drop 3 times daily  polyvinyl alcohol (ARTIFICIAL TEARS) 1.4 % ophthalmic solution, Place 1 drop into both eyes 3 times daily as needed for Dry Eyes  Artificial Tear Ointment (REFRESH LACRI-LUBE) OINT ointment, Apply 1 inch to eye nightly as needed   magnesium oxide (MAG-OX) 400 (240 MG) MG tablet, take 1 tablet by mouth once daily (Patient taking differently: Take 400 mg by mouth daily take 1 tablet by mouth once daily)  docusate sodium (COLACE) 100 MG capsule, Take 200 mg by mouth daily   calcium citrate-vitamin D (CITRICAL + D) 315-250 MG-UNIT TABS per tablet, Take 2 tablets by mouth daily   aspirin 81 MG tablet, Take 81 mg by mouth daily  magnesium hydroxide (MILK OF MAGNESIA) 400 MG/5ML suspension, Take 30 mLs by mouth daily as needed for Constipation  acetaminophen (TYLENOL) 325 MG tablet, Take 650 mg by mouth every 4 hours as needed for Pain or Fever   loperamide (IMODIUM) 2 MG capsule, Take 2 mg by mouth 4 times daily as needed for Diarrhea    Allergies:    Lopid [gemfibrozil]    Social History:    reports that she has never smoked. She has never used smokeless tobacco. She reports that she does not drink alcohol or use drugs. Family History:   family history includes Cancer (age of onset: 80) in her sister; Cancer (age of onset: 80) in her mother; High Blood Pressure in her mother. REVIEW OF SYSTEMS:  As above in the HPI, otherwise negative    PHYSICAL EXAM:    Vitals:  BP (!) 143/58   Pulse 67   Temp 98.8 °F (37.1 °C) (Temporal)   Resp 15   Ht 5' 3\" (1.6 m)   Wt 136 lb (61.7 kg)   LMP  (LMP Unknown)   SpO2 95%   BMI 24.09 kg/m²     General:  Awake, alert, oriented X 2  Well developed, well nourished, well groomed. No apparent distress. HEENT:  Normocephalic, atraumatic. Pupils equal, round, reactive to light. No scleral icterus. No conjunctival injection. Normal lips, teeth, and gums. No nasal discharge. Neck:  Supple  Heart:  RRR, no murmurs, gallops, rubs  Lungs:  CTA bilaterally, bilat symmetrical expansion, no wheeze, rales, or rhonchi  Abdomen:   Bowel sounds present, soft, nontender, no masses, no organomegaly, no peritoneal signs  Extremities:  No clubbing, cyanosis, or edema  Skin:  Warm and dry, no open lesions or rash  Neuro:  Cranial nerves 2-12 intact, no focal deficits  Breast: deferred  Rectal: deferred  Genitalia:  deferred    LABS:    CBC with Differential:    Lab Results   Component Value Date    WBC 12.2 03/27/2021    RBC 3.77 03/27/2021    HGB 11.6 03/27/2021 HCT 33.3 03/27/2021     03/27/2021    MCV 88.3 03/27/2021    MCH 30.8 03/27/2021    MCHC 34.8 03/27/2021    RDW 13.0 03/27/2021    NRBC 0.0 08/06/2020    LYMPHOPCT 6.3 03/27/2021    LYMPHOPCT 18.2 08/06/2020    MONOPCT 5.1 03/27/2021    BASOPCT 0.2 03/27/2021    MONOSABS 0.62 03/27/2021    LYMPHSABS 0.77 03/27/2021    EOSABS 0.01 03/27/2021    BASOSABS 0.02 03/27/2021     CMP:    Lab Results   Component Value Date     03/27/2021    K 3.7 03/27/2021    K 2.9 03/26/2021    CL 81 03/27/2021    CO2 29 03/27/2021    BUN 19 03/27/2021    CREATININE 0.8 03/27/2021    GFRAA >60 03/27/2021    LABGLOM >60 03/27/2021    LABGLOM 45 03/24/2021    GLUCOSE 123 03/27/2021    GLUCOSE 85 03/24/2021    PROT 6.8 03/27/2021    LABALBU 4.2 03/27/2021    LABALBU 3.5 08/06/2020    CALCIUM 9.2 03/27/2021    BILITOT 0.6 03/27/2021    BILITOT Negative 11/19/2020    ALKPHOS 118 03/27/2021    AST 14 03/27/2021    ALT 12 03/27/2021     Magnesium:    Lab Results   Component Value Date    MG 2.4 03/27/2021     Phosphorus:    Lab Results   Component Value Date    PHOS 3.4 03/27/2021     PT/INR:    Lab Results   Component Value Date    PROTIME 11.2 03/26/2021    INR 1.0 03/26/2021     Last 3 Troponin:    Lab Results   Component Value Date    TROPONINI <0.01 03/26/2021    TROPONINI <0.01 12/01/2017    TROPONINI <0.01 06/15/2015     U/A:    Lab Results   Component Value Date    COLORU Yellow 11/19/2020    PROTEINU 2+ 11/19/2020    PHUR 6.0 12/04/2017    WBCUA TNTC 11/19/2020    RBCUA NONE 12/04/2017    RBCUA 2-5 05/09/2013    BACTERIA 3+ 11/19/2020    BACTERIA NONE 12/04/2017    CLARITYU Cloudy 11/19/2020    SPECGRAV 1.015 11/19/2020    LEUKOCYTESUR 1+ 11/19/2020    UROBILINOGEN 1.0 11/19/2020    BILIRUBINUR Negative 12/04/2017    BLOODU Negative 12/04/2017    GLUCOSEU Negative 12/04/2017    AMORPHOUS RARE 07/20/2017     ABG:  No results found for: PH, PCO2, PO2, HCO3, BE, THGB, TCO2, O2SAT  HgBA1c:  No results found for: LABA1C  FLP: No results found for: TRIG, HDL, LDLCALC, LDLDIRECT, LABVLDL  TSH:    Lab Results   Component Value Date    TSH 1.340 08/06/2020       MRI BRAIN WO CONTRAST    (Results Pending)   CT HEAD WO CONTRAST    (Results Pending)       ASSESSMENT:      Principal Problem:    Acute CVA (cerebrovascular accident) ([address]  Active Problems:    Essential hypertension    Chronic atrial fibrillation (HCC)    Hyponatremia  Resolved Problems:    * No resolved hospital problems.  *      PLAN:    70-year-old female history of atrial fibrillation admitted with acute CVA status post TPA admitted to ICU    Neuro ICU after TPA  BP control  Monitor neuro status closely  Neuro consult  Critical care consult  No anticoagulants 24 hrs  Repeat CT 24hrs post TPA  MRI brain  Medications for other co morbidities cont as appropriate w dosage adjustments as necessary PT/OT  DVT PPx- SCDs  DC planning         Electronically signed by Armando Ramey MD on 3/27/2021 at 2:24 PM

## 2021-03-27 NOTE — PROGRESS NOTES
Surgical  Neuro Science Intensive Care Unit  Critical Care  Daily Progress Note 3/27/2021    Date of Admission: 03/26/2021    CC: Follow up for stroke    HOSPITAL COURSE/OVERNIGHT EVENTS:    03/26  Presented to ED form her ECF with acute onset aphasia & facial droop. PMH includes Hx of afib, HTN, HLD, LKW 1650. Initial /82. NIH SS 10. Diagnostic imaging identified possible defect in cerebellar area. Decision made to give tpa. Admitted to ICU.    03/27 No issues. PHYSICAL EXAM:  BP (!) 148/69   Pulse 69   Temp 97.6 °F (36.4 °C) (Temporal)   Resp 14   Ht 5' 3\" (1.6 m)   Wt 136 lb (61.7 kg)   LMP  (LMP Unknown)   SpO2 96%   BMI 24.09 kg/m²     Intake/Output Summary (Last 24 hours) at 3/27/2021 1110  Last data filed at 3/27/2021 0600  Gross per 24 hour   Intake 120 ml   Output 950 ml   Net -830 ml     General appearance:  Comfortable. Pain Description: none    NEUROLOGIC:   RASS Score:  0  GCS:  15  4 - Opens eyes on own   6 - Follows simple motor commands  5 - Alert and oriented       Pupil size:  Left 4 mm  Right 4 mm  Pupil reaction: Yes   PERRLA  Wiggles fingers: Left   Yes  Right Yes  Hand grasp:   Left: Yes     Right    Yes  Wiggles toes: Left   Yes Right  Yes  Plantar flexion: Left  Yes  Right   Yes  Facial droop:   none   Speech:  Dysarthria. Left side weaker than right side. CONSTITUTIONAL: No acute distress, lying in hospital bed. CARDIOVASCULAR: S1 S2, regular rate, regular rhythm, no murmur/gallop/rub. Monitor: clear  PULMONARY: Bilaterally clear. No rhonchi/rales/wheezes, no use of accessory muscles. Room air. RENAL:Voids. García to gravity, clear yellow urine. Fluid balance for previous 24 hours:  Not applicable. ABDOMEN: Soft, nontender, nondistended, nontympanic, normal bowel sounds. Passed bedside swallow. Diet: Dysphagias 3 diet, soft bite sized with honey thickened liquids. No reported nausea or vomiting.     MUSCULOSKELETAL:  Moves all extremities purposefully. Left sided weakness. Bilateral shoulder issues. SKIN/EXTREMITIES: No rashes/ecchymosis, no edema/clubbing, warm/dry, good capillary refill. LINES:  Peripheral     Recent Labs     03/26/21  1809 03/27/21  0510   WBC 14.9* 12.2*   HGB 11.3* 11.6   HCT 32.6* 33.3*   MCV 87.9 88.3    236       Recent Labs     03/26/21  1809 03/27/21  0510   * 124*   K 2.9* 3.7   CO2 30* 29   PHOS  --  3.4   BUN 22 19   CREATININE 0.9 0.8       Recent Labs     03/26/21  1809 03/27/21  0510   PROT 6.6 6.8   INR 1.0  --        ASSESSMENT/PLAN:     Active Problems:    Acute CVA (cerebrovascular accident) ([facility] [address]  Resolved Problems:    * No resolved hospital problems. *    Neuro:  Embolic stroke involving left cerebellar stroke. Right ICA with 60% stenosis. Hx of vision issues & [place]. Monitor neuro status. Neurology following. Stroke education. Stroke protocol. Speech therapy. MRI brain ordered. CV:  No acute issues. Hx of Afib,  HTN, Hyperlipidemia and CHF    Monitor hemodynamics. BP goal systolic less than 590 mm Hg. PRN hydralzine & labetalol. Statin. Amiodarone. Norvasc. Metoprolol. Echo pending. Pulm: No acute issues. Monitor RR & SpO2. O2 as needed. Encourage cough, SMI  & deep breathing. GI: . Dysphagia. BMI 24. Monitor bowel function. Diet:   Zofran. Bowel regime. Renal:  CKD Stage 3. Monitor BUN & Cr, electrolytes & replace as needed. Monitor I & O.    Voids. Female incontinence device. ID: No acute issues  Endocrine: Hyperglycemia. Monitor BS.    ISS.       MSK: Left sided weakness. Deconditioned. Non ambulatory at [facility]. Bilateral shoulder decreased ROM. Hx of arthritis    ROM. Turn & reposition. PT & OT pending recommendations  Monitor for skin breakdown. Heme: No acute issues. Hx of breast cancer. Monitor CBC. Bowel regime: senna. Glycolax.     Pain control/Sedation: Tylenol  DVT prophylaxis: SCD and No Lovenox/Heparin at this time due to tpa   GI prophylaxis: Pepcid and Diet  Glucose protocol:  ISS  Ancillary consults:  Neurology, Medicine and Critical care  Patient/Family update:  Daughter at bedside. Questions answered. Support given. Code status:  Full code. Disposition: Admitted to ICU.       Electronically signed by Dot Davalos RN MSN APRN-NP [facility] NP  CCNS CCRN 3/27/2021 1:00 PM

## 2021-03-28 PROBLEM — I63.442 EMBOLIC STROKE INVOLVING LEFT CEREBELLAR ARTERY (HCC): Status: ACTIVE | Noted: 2021-01-01

## 2021-03-28 NOTE — PROGRESS NOTES
nontympanic, normal bowel sounds. Passed bedside swallow. Diet: Dysphagias 3 diet, soft bite sized with honey thickened liquids. No reported nausea or vomiting. No BM since admission. MUSCULOSKELETAL:  Moves all extremities purposefully. Left sided weakness. Bilateral shoulder issues. SKIN/EXTREMITIES: No rashes/ecchymosis, no edema/clubbing, warm/dry, good capillary refill. LINES:  Peripheral     Recent Labs     03/26/21 1809 03/27/21 0510 03/28/21  0330   WBC 14.9* 12.2* 14.5*   HGB 11.3* 11.6 11.3*   HCT 32.6* 33.3* 33.0*   MCV 87.9 88.3 88.9    236 252       Recent Labs     03/26/21 1809 03/27/21 0510 03/28/21  0330   * 124* 127*   K 2.9* 3.7 3.3*   CO2 30* 29 31*   PHOS  --  3.4 2.5   BUN 22 19 16   CREATININE 0.9 0.8 0.8       Recent Labs     03/26/21 1809 03/27/21 0510 03/28/21  0330   PROT 6.6 6.8 6.4   INR 1.0  --   --        ASSESSMENT/PLAN:     Principal Problem:    Acute CVA (cerebrovascular accident) ([facility] [address]  Active Problems:    Essential hypertension    Chronic atrial fibrillation (HCC)    Hyponatremia  Resolved Problems:    * No resolved hospital problems. *    Neuro:  Embolic stroke involving left cerebellar stroke. Right ICA with 60% stenosis. Hx of vision issues & [place]. Monitor neuro status. Neurology following. Stroke education. Stroke protocol. Speech therapy. MRI brain ordered. CV:  No acute issues. Hx of Afib,  HTN, Hyperlipidemia and CHF    Monitor hemodynamics. BP goal systolic less than 171 mm Hg. PRN hydralzine & labetalol. Statin. Amiodarone. Norvasc. Metoprolol. Echo pending. Pulm: No acute issues. Monitor RR & SpO2. O2 as needed. Encourage cough, SMI  & deep breathing. GI: . Dysphagia. BMI 24. Monitor bowel function. Diet:   Zofran. Bowel regime. Renal:  CKD Stage 3. Monitor BUN & Cr, electrolytes & replace as needed. Monitor I & O.    Voids. Female incontinence device.     ID: No acute issues  Endocrine: Hyperglycemia. Monitor BS.    ISS.       MSK: Left sided weakness. Deconditioned. Non ambulatory at [facility]. Bilateral shoulder decreased ROM. Hx of arthritis    ROM. Turn & reposition. PT & OT pending recommendations  Monitor for skin breakdown. Heme: No acute issues. Hx of breast cancer. Monitor CBC. Bowel regime: senna. Glycolax. Senna. Pain control/Sedation: Tylenol  DVT prophylaxis: SCD and No Lovenox/Heparin at this time due to tpa   GI prophylaxis: Pepcid and Diet  Glucose protocol:  ISS  Ancillary consults:    Palliative care, Neurology, Medicine and Critical care  Patient/Family update: Will update the family when visting. Code status:  Full code. Disposition: Transfer.       Electronically signed by Kapil Paredes RN MSN APRN-NP [facility] NP  CCNS CCRN 3/28/2021 8:37 AM

## 2021-03-28 NOTE — PLAN OF CARE
Problem: Physical Regulation:  Goal: Ability to maintain clinical measurements within normal limits will improve  Description: Ability to maintain clinical measurements within normal limits will improve  3/28/2021 0036 by Nette Stephen RN  Outcome: Met This Shift  3/27/2021 1125 by Avelino Kinsey RN  Outcome: Met This Shift  Goal: Will regain or maintain usual level of consciousness  Description: Will regain or maintain usual level of consciousness  3/28/2021 0036 by Nette Stephen RN  Outcome: Met This Shift  3/27/2021 1125 by Avelino Kinsey RN  Outcome: Met This Shift     Problem: Tissue Perfusion:  Goal: Signs of adequate cerebral perfusion will increase  Description: Signs of adequate cerebral perfusion will increase  3/28/2021 0036 by Nette Stephen RN  Outcome: Met This Shift  3/27/2021 1125 by Avelino Kinsey RN  Outcome: Met This Shift  Goal: Ability to maintain intracranial pressure will improve  Description: Ability to maintain intracranial pressure will improve  Outcome: Met This Shift     Problem: HEMODYNAMIC STATUS  Goal: Patient has stable vital signs and fluid balance  Outcome: Met This Shift     Problem: ACTIVITY INTOLERANCE/IMPAIRED MOBILITY  Goal: Mobility/activity is maintained at optimum level for patient  Outcome: Ongoing     Problem: COMMUNICATION IMPAIRMENT  Goal: Ability to express needs and understand communication  Outcome: Met This Shift     Problem: Skin Integrity:  Goal: Will show no infection signs and symptoms  Description: Will show no infection signs and symptoms  3/28/2021 0036 by Nette Stephen RN  Outcome: Met This Shift  3/27/2021 1125 by Avelino Kinsey RN  Outcome: Met This Shift  Goal: Absence of new skin breakdown  Description: Absence of new skin breakdown  3/28/2021 0036 by Nette Stephen RN  Outcome: Met This Shift  3/27/2021 1125 by Avelino Kinsey RN  Outcome: Met This Shift     Problem: Falls - Risk of:  Goal: Will remain free from falls  Description: Will remain free from falls  3/28/2021 0036 by Naveen Britton RN  Outcome: Met This Shift  3/27/2021 1125 by Erin Cota RN  Outcome: Met This Shift  Goal: Absence of physical injury  Description: Absence of physical injury  3/28/2021 0036 by Naveen Britton RN  Outcome: Met This Shift  3/27/2021 1125 by Erin Cota RN  Outcome: Met This Shift

## 2021-03-28 NOTE — PROGRESS NOTES
Subjective:  Feeling better   No CP or SOB  No fever or chills   No uncontrolled pain  No vomiting or diarrhea     Objective:    BP (!) 150/84   Pulse 82   Temp 99 °F (37.2 °C) (Temporal)   Resp (!) 6   Ht 5' 3\" (1.6 m)   Wt 142 lb 6.7 oz (64.6 kg)   LMP  (LMP Unknown)   SpO2 95%   BMI 25.23 kg/m²     24HR INTAKE/OUTPUT:      Intake/Output Summary (Last 24 hours) at 3/28/2021 0851  Last data filed at 3/28/2021 0850  Gross per 24 hour   Intake 450 ml   Output 1300 ml   Net -850 ml       General appearance: NAD, conversant  Neck: FROM, supple    Lungs: Clear bilaterally no wheezes, no rhonchi, no crackles  CV: RRR, no MRGs; normal carotid upstroke and amplitude without Bruits  Abdomen: Soft, non-tender; no masses or HSM  Extremities: No edema, no cyanosis, no clubbing  Skin: Intact no rash, no lesions, no ulcers    Psych: Alert and oriented normal affect  Neuro: Nonfocal moving 4 extremities symmetrically  Most Recent Labs  Lab Results   Component Value Date    WBC 14.5 (H) 03/28/2021    HGB 11.3 (L) 03/28/2021    HCT 33.0 (L) 03/28/2021     03/28/2021     (L) 03/28/2021    K 3.3 (L) 03/28/2021    CL 84 (L) 03/28/2021    CREATININE 0.8 03/28/2021    BUN 16 03/28/2021    CO2 31 (H) 03/28/2021    GLUCOSE 101 (H) 03/28/2021    ALT 11 03/28/2021    AST 18 03/28/2021    INR 1.0 03/26/2021    TSH 1.340 08/06/2020    LABA1C 5.4 03/28/2021     Recent Labs     03/28/21  0330   MG 2.3     Lab Results   Component Value Date    CALCIUM 9.4 03/28/2021    PHOS 2.5 03/28/2021        CT HEAD WO CONTRAST   Final Result   Low-attenuation changes in the left medial superior cerebellum consistent   with evolution of the patient's known ischemic changes. No evidence for   intracranial hemorrhage.          MRI BRAIN WO CONTRAST    (Results Pending)       Assessment    Principal Problem:    Acute CVA (cerebrovascular accident) [facility])  Active Problems:    Essential hypertension    Paroxysmal atrial fibrillation ([facility] [address] Hyponatremia  Resolved Problems:    * No resolved hospital problems. *      Plan:  29-year-old female history of atrial fibrillation admitted with acute CVA status post TPA admitted to ICU     Neuro ICU after TPA  BP control  Monitor neuro status closely  Neuro Consult appreciated cussed case  Critical care consult  No anticoagulants 24 hrs-->DAPT  -Possible full AC given history of A.  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

## 2021-03-28 NOTE — PROGRESS NOTES
pupils DAYA   III,VII: ptosis None   III,IV,VI: extraocular muscles  Full ROM   V: mastication Normal   V: facial light touch sensation  Normal   V,VII: corneal reflex     VII: facial muscle function - upper  Normal   VII: facial muscle function - lower L facial droop    VIII: hearing Normal   IX: soft palate elevation  Normal   IX,X: gag reflex    XI: trapezius strength  5/5   XI: sternocleidomastoid strength 5/5   XI: neck extension strength  5/5   XII: tongue strength  Normal     Motor:  5/5 RUE  4/5 LUE  4/5 BLE  Normal tone and bulk   No abnormal movements     Sensory:  LT normal in all extremities     Coordination:   FFM, FNF impaired on L  Unable to do HTS b/l     DTR:     No [place]is    Laboratory/Radiology:     CBC with Differential:    Lab Results   Component Value Date    WBC 14.5 03/28/2021    RBC 3.71 03/28/2021    HGB 11.3 03/28/2021    HCT 33.0 03/28/2021     03/28/2021    MCV 88.9 03/28/2021    MCH 30.5 03/28/2021    MCHC 34.2 03/28/2021    RDW 13.3 03/28/2021    NRBC 0.0 08/06/2020    LYMPHOPCT 7.1 03/28/2021    LYMPHOPCT 18.2 08/06/2020    MONOPCT 6.8 03/28/2021    BASOPCT 0.2 03/28/2021    MONOSABS 0.98 03/28/2021    LYMPHSABS 1.03 03/28/2021    EOSABS 0.07 03/28/2021    BASOSABS 0.03 03/28/2021     CMP:    Lab Results   Component Value Date     03/28/2021    K 3.3 03/28/2021    K 2.9 03/26/2021    CL 84 03/28/2021    CO2 31 03/28/2021    BUN 16 03/28/2021    CREATININE 0.8 03/28/2021    GFRAA >60 03/28/2021    LABGLOM >60 03/28/2021    LABGLOM 45 03/24/2021    GLUCOSE 101 03/28/2021    GLUCOSE 85 03/24/2021    PROT 6.4 03/28/2021    LABALBU 3.7 03/28/2021    LABALBU 3.5 08/06/2020    CALCIUM 9.4 03/28/2021    BILITOT 0.8 03/28/2021    BILITOT Negative 11/19/2020    ALKPHOS 108 03/28/2021    AST 18 03/28/2021    ALT 11 03/28/2021     HgBA1c:    Lab Results   Component Value Date    LABA1C 5.4 03/28/2021     CTH  No acute intracranial abnormality    CTA head/neck  1.  Ischemic penumbra in the left superior cerebellum and the medial occipital  lobe within the superior cerebellar and posterior cerebral artery territories. 2. Left superior cerebellar artery occlusion. 3. Extensive intracranial atherosclerosis with multifocal severe stenosis of  distal cerebral artery branches, including the left posterior cerebral artery. 4. Bilateral vertebral artery multifocal severe stenosis. 5. Right internal carotid artery 60% stenosis. R/P CTH  Low-attenuation changes in the left medial superior cerebellum consistent  with evolution of the patient's known ischemic changes.  No evidence for  intracranial hemorrhage. I personally reviewed all labs and images today   Assessment:     L cerebellar stroke    Mechanism HARRY embolus from significant intracranial atherosclerotic disease vs cardioembolic from hx of Afib, not on anticoagulation due to hx of SDH. MRI brain to better see distribution of stroke to help support mechanism to determine appropriate therapy with DAPT vs AP + AC.      Plan:     F/u MRI kristen     F/u echocardiogram     Check LDL     DAPT for now and high intensity statin     PT/OT/speech    Stroke education     Jhony Felix PA-C  9:16 AM  3/28/2021

## 2021-03-28 NOTE — PLAN OF CARE
Problem: Physical Regulation:  Goal: Ability to maintain clinical measurements within normal limits will improve  Description: Ability to maintain clinical measurements within normal limits will improve  3/28/2021 0036 by Parth Armenta RN  Outcome: Met This Shift  Goal: Will regain or maintain usual level of consciousness  Description: Will regain or maintain usual level of consciousness  3/28/2021 0036 by Parth Armenta RN  Outcome: Met This Shift     Problem: Tissue Perfusion:  Goal: Signs of adequate cerebral perfusion will increase  Description: Signs of adequate cerebral perfusion will increase  3/28/2021 0036 by Parth Armenta RN  Outcome: Met This Shift  Goal: Ability to maintain intracranial pressure will improve  Description: Ability to maintain intracranial pressure will improve  3/28/2021 0036 by Parth Armenta RN  Outcome: Met This Shift     Problem: HEMODYNAMIC STATUS  Goal: Patient has stable vital signs and fluid balance  3/28/2021 0036 by Parth Armenta RN  Outcome: Met This Shift     Problem: COMMUNICATION IMPAIRMENT  Goal: Ability to express needs and understand communication  3/28/2021 0852 by Anna Pelletier RN  Outcome: Met This Shift  3/28/2021 0036 by Parth Armenta RN  Outcome: Met This Shift     Problem: Skin Integrity:  Goal: Will show no infection signs and symptoms  Description: Will show no infection signs and symptoms  3/28/2021 0036 by Parth Armenta RN  Outcome: Met This Shift  Goal: Absence of new skin breakdown  Description: Absence of new skin breakdown  3/28/2021 0036 by Parth Armenta RN  Outcome: Met This Shift     Problem: Falls - Risk of:  Goal: Will remain free from falls  Description: Will remain free from falls  3/28/2021 0852 by Anna Pelletier RN  Outcome: Met This Shift  3/28/2021 0036 by Parth Armenta RN  Outcome: Met This Shift  Goal: Absence of physical injury  Description: Absence of physical injury  3/28/2021 0852 by Anna Pelletier RN  Outcome: Met This Shift  3/28/2021 0036 by Donald Reyes, RN  Outcome: Met This Shift

## 2021-03-29 NOTE — PROGRESS NOTES
OCCUPATIONAL THERAPY INITIAL EVALUATION      Date:3/29/2021  Patient Name: Abraham Jennings  MRN: [id number]  : 1924  Room: [address] [place] [address]    Referring Provider: TASHI Vaughn - CNS    Evaluating OT: MARIAH Tejeda/L #[id number]    AM-PAC Daily Activity Raw Score: 10/24    Modified Cache Scale   Score     Description  0             No symptoms  1             No significant disability despite symptoms  2             Slight disability; able to look after own affairs  3             Moderate disability; able to ambulate without assist/ requires assist with ADLs  4             Moderate/Severe disability;requires assist to ambulate/assist with ADLs  5             Severe disability;bedridden/incontinent   6               Score:   5    Recommended Adaptive Equipment/DME: To be further assessed      Diagnosis: acute CVA      Pertinent Medical History: CHF, CVA, HLD, HTN  Procedure/Surgery:  3/26 tPA   Precautions:  Falls, bed alarm, O2, [place], dysarthria, pure wick, TAPS     Home Living: Pt lives in an [place]    Prior Level of Function:  Per pt's daughter: pt was indep with feeding self, required assist with most ADLs with all  IADLs; using w/c for mobility.  Staff assisted her with propelling w/c        Pain Level: none noted  Cognition: A&O: self, place, month, year   Follows 2 step directions: fair    Memory:  poor+ (provided OT with incorrect PLOF)   Sequencing:  fair    Problem solving:  poor   Judgement/safety:  poor     Functional Assessment:   Initial Eval Status  Date: 3/29/21 Treatment Status  Date: STG=LTG  Time frame: 5-7 days   Feeding Moderate Assist   Hand to mouth   Pt was issued a red foam build up for increased  on utensils  Stand by Assist    Grooming Maximal Assist   To comb hair  Minimal Assist    UB Dressing Maximal Assist   Moderate Assist    LB Dressing Dependent   To don/doff socks  Maximal Assist    Bathing Maximal Assist  simulated  Moderate Assist Toileting Dependent   Maximal Assist    Bed Mobility  Rolling: Max A  Supine to sit: Maximal Assist   Sit to supine: Maximal Assist   Supine to sit: Minimal Assist   Sit to supine: Minimal Assist    Functional Transfers Sit to stand: Maximal Assist ( to partially clear surface of bed)  Stand to sit: Maximal Assist   Stand pivot: NT   Moderate Assist    Functional Mobility NT  TBD   Balance Sitting:     Static:  Min A    Dynamic:mod A  Standing: max A  Sitting:     Static:  supervision    Dynamic:min A  Standing: mod A   Activity Tolerance poor  fair   Visual/  Perceptual Glasses: yes  Perception: NT          BUE  ROM/Strength/  Fine motor Coordination Hand dominance: R    RUE: ROM shoulder flex approx 45 degree, WFL distally     Strength: grossly 3-/5      Strength: poor+     Coordination:  poor    LUE: ROM shoulder flex limited to approx 45  WFL distally     Strength: grossly 3-/5      Strength: poor+     Coordination: poor  Increase B UE strength 1/3 muscle grade   Increase B hand /coordination to fair for SBA feeding self     Hearing: [place]  Sensation:  NT,   Tone:  WFL   Edema: none noted    Comments: RN cleared pt for OT. Upon arrival patient in bed. Daughter present in room. Pt listing to L in bed and was unable to self correct posture. . Overall patient demonstrated  decreased coordination, sitting balance, decreased  independence with  ADLs,  bed mobility,functional transfers, Pt's daughter expressed concerns with pt holding utensils. OT issued red foam buildups for utensils to improve . At end of session, patient in bed with call light and phone within reach, all lines and tubes intact. Pt would benefit from continued skilled OT to increase independence with  ADLs, functional mobility,  safety,  and quality of life.     Treatment: OT treatment provided this date includes:    ADL-  Instruction/training on safety and adapted techniques for completion of ADLs:     Mobility- Instruction/training on safety and improved independence with bed mobility/functional transfers     Sitting EOB x 8 minutes to improve dynamic sitting balance and activity tolerance during ADLs.   Neuromuscular Facilitation of B UE functional movement/ROM. Olga Main motor dexterity     Skilled positioning/alignment-  Proper Positioning/Alignment in bed with use of TAPS and wedges    Assessment of current deficits   Functional mobility [x]  ADLs [x] Strength [x]  Cognition [x]  Functional transfers  [x] IADLs [] Safety Awareness [x]  Endurance [x]  Fine Motor Coordination [x] Balance [x] Vision/perception [] Sensation []   Gross Motor Coordination [x] ROM [] Delirium []                  Motor Control []    Plan of Care: 2-5 days/wk for 1-2 weeks PRN     Instruction/training on adapted ADL techniques and AE recommendations to increase functional independence within precautions  Functional transfer/mobility training/DME recommendations for increased independence, safety, and fall prevention  Patient/Family education to increase follow through with safety techniques and functional independence  Cognitive retraining/development of therapeutic activities to improve problem solving, judgement, memory, and attention for increased safety/participation in ADL/IADL tasks  Therapeutic exercise to improve motor endurance, ROM, and functional strength for ADLs/functional transfers  Therapeutic activities to facilitate/challenge dynamic balance, stand tolerance, fine motor dexterity/in-hand manipulation for increased independence with ADLs  Neuro-muscular re-education: facilitation of righting/equilibrium reactions, midline orientation, scapular stability/mobility, normalization of muscle tone, and facilitation of volitional active controled movement      Rehab Potential: Good for established goals     Patient / Family Goal: get more therapy     Patient and/or family were instructed on functional diagnosis, prognosis/goals and OT plan of care. Demonstrated fair understanding. · Mod Complexity  · History: Expanded review of medical records and additional review of physical, cognitive, or psychosocial history related to current functional performance  · Exam: 5+ performance deficits  · Assistance/Modification: mod/max assistance or modifications required to perform tasks. May have comorbidities that affect occupational performance.   mod Evaluation complexity completed +     Time In: 1140  Time Out: 12:10  Total Treatment Time: 23 minutes    Min Units   OT Eval Low 52722       OT Eval Medium 86929  x    OT Eval High 54969       OT Re-Eval [id number]       Therapeutic Ex 42477       Therapeutic Activities 52924  10  1   ADL/Self Care 15306  13  1   Orthotic Management 23324       Neuro Re-Ed 84871       Total Treatment Time 23 2       Evaluation time includes thorough review of current medical information, gathering information on past medical history/social history and prior level of function, completion of standardized testing/informal observation of tasks, assessment of data, and development of POC/Goals    MARIAH Cobos/LATOYA #[id number]

## 2021-03-29 NOTE — PROGRESS NOTES
Problems:    Essential hypertension    Paroxysmal atrial fibrillation (HCC)    Hyponatremia  Resolved Problems:    * No resolved hospital problems. *       Continue aspirin, plavix, and statin    Glucose well controlled    Small IV fluid bolus for persistent hyponatremia, improving slowly    Continue metoprolol; not on AC at this point. MRI personally reviewed. Subacute CVA.     Requires continued inpatient level of care     Nerissa Burn    1:07 PM  3/29/2021

## 2021-03-29 NOTE — PROGRESS NOTES
Physical Therapy  Physical Therapy Initial Assessment     Name: Lev Jennings  : 1924  MRN: [id number]    Referring Provider:  TASHI Meade - MANISHA    Date of Service: 3/29/2021    Evaluating PT:  Jackie Do PT, DPT [id number]    Room #:  9157/7865-Z  Diagnosis:  Acute CVA  Precautions: Falls, bed alarm, O2, [place], dysarthria, mild RLE weakness  Procedure/Surgery:  3/26 tPA  PMHx/PSHx:  CHF, CVA, HLD, HTN  Equipment Needs:  TBD    SUBJECTIVE:    Pt was admitted from SNF[facility]. Pt was wheelchair level using BLEs to propel chair. Pt required assistance with ADLs PTA. OBJECTIVE:   Initial Evaluation  Date: 3/29/21 Treatment Short Term/ Long Term   Goals   AM-PAC 6 Clicks      Was pt agreeable to Eval/treatment? Yes     Does pt have pain? No c/o pain     Bed Mobility  Rolling: MaxA  Supine to sit: MaxA  Sit to supine: MaxA  Scooting: MaxA  [place]   Transfers Sit to stand: MaxA  Stand to sit: MaxA  Stand pivot: NT  [place] with AAD   Ambulation   NT  >15 feet with [place] with AAD   Wheelchair Mobility  NT  >50 feet with [place] on level surfaces   ROM BUE:  Defer to OT note  BLE:  WFL     Strength BUE:  Defer to OT note  LLE:  4/5  RLE:  4-/5  Increase by 1/3 MMT grade   Balance Sitting EOB:  [place] dynamic  Dynamic Standing:  MaxA with [employer]  Sitting EOB:  Independent  Dynamic Standing:  [place] with AAD     Pt is A & O x 3  Sensation:  Impaired light touch to S1 dermatome on L  Edema:  None    Therapeutic Exercises:  NA    Patient education  Pt educated on safety    Patient response to education:   Pt verbalized understanding Pt demonstrated skill Pt requires further education in this area   x x x     ASSESSMENT:    Comments:  Pt was in bed upon arrival, agreeable to initial evaluation. Pt exhibited slurred speech and was difficult to understand at times. Increased assistance provided for all mobility. Upon sitting EOB, pt demonstrated a R lateral lean and used the bed rail for safety.   Flexed posture also noted with cues to achieve a neutral cervical spine. Mild RLE weakness noted with MMT and standing. Again, pt's posture was flexed to the R in standing. Fatigue limited further activity. Pt was left in bed with all needs met and call light in reach. Bed alarm reactivated. Treatment:  Patient practiced and was instructed in the following treatment:     Bed mobility training - pt given verbal and tactile cues to facilitate proper sequencing and safety during rolling and supine>sit as well as provided with physical assistance to complete task    Sitting EOB for >8 minutes for upright tolerance, postural awareness and BLE ROM   Transfer training - pt was given verbal and tactile cues to facilitate proper hand placement, technique and safety during sit to stand and stand to sit as well as provided with physical assistance to complete task.  Skilled positioning - Pt placed in the chair position to facilitate upright posture, joint and skin integrity, and interaction with environment. Pt's/ family goals   1. Return home    Patient and or family understand(s) diagnosis, prognosis, and plan of care. Yes    PLAN OF CARE:    Current Treatment Recommendations     [x] Strengthening     [] ROM   [x] Balance Training   [x] Endurance Training   [x] Transfer Training   [x] Gait Training   [] Stair Training   [x] Positioning   [x] Safety and Education Training   [x] Patient/Caregiver Education   [] HEP  [] Other     Frequency of treatments: 2-5x/week x 1-2 weeks. Time in  0735  Time out  0800    Total Treatment Time  20 minutes     Evaluation Time includes thorough review of current medical information, gathering information on past medical history/social history and prior level of function, completion of standardized testing/informal observation of tasks, assessment of data and education on plan of care and goals.     CPT codes:  [] Low Complexity PT evaluation 88274  [x] Moderate Complexity PT evaluation 07797 [] High Complexity PT evaluation [id number]  [] PT Re-evaluation [id number]  [] Gait training 91851 - minutes  [] Manual therapy 64324 - minutes  [x] Therapeutic activities 14311 20 minutes  [] Therapeutic exercises 41520 - minutes  [] Neuromuscular reeducation 57844 - minutes     Jackie Do, PT, DPT  [id number]

## 2021-03-29 NOTE — PROGRESS NOTES
Jessica Sky is a 80 y.o.  female     Neurology is following for stroke. PMH: Prior stroke, hypertension, A. fib (not on anticoagulation due to SDH), hyperlipidemia, breast cancer, CHF, CKD    She presented with sudden onset speech difficulties. CTA of the head and neck revealed extensive atherosclerotic disease, but no LVO. Sodium was low at 121. She was given TPA. She was on Plavix and Zocor at home. 24-hour CT head post tPA without bleed. MRI of the brain completed on 3/28 shows subacute hemorrhagic infarct in the left cerebellar hemisphere with areas of edema and mild mass-effect; chronic infarcts also noted. Complete echo pending--- came in as I was leaving    + dizziness but cannot quantify it.   + continued speech difficulties. + L side weakness. No chest pain or palpitations  No SOB  No vertigo, lightheadedness or loss of consciousness  No falls, tripping or stumbling  No incontinence of bowels or bladder  No itching or bruising appreciated    ROS otherwise negative     Patient's  is at bedside    Patient remains hypertensive otherwise vital stable    Allergies   Allergen Reactions    Lopid [Gemfibrozil] Other (See Comments)     Leg weakness       Objective:       BP (!) 176/69   Pulse 65   Temp 98.6 °F (37 °C) (Temporal)   Resp 20   Ht 5' 3\" (1.6 m)   Wt 142 lb 6.7 oz (64.6 kg)   LMP  (LMP Unknown)   SpO2 100%   BMI 25.23 kg/m²        General appearance: alert, appears stated age and cooperative  Head: Normocephalic, without obvious abnormality, atraumatic  Eyes: conjunctivae/corneas clear. Neck: Limited ROM. No carotid bruit.    Lungs: clear to auscultation bilaterally  Heart: regular rate and rhythm, S1, S2 normal, no murmur, click, rub or gallop  Extremities: extremities normal, atraumatic, no cyanosis or edema  Skin: Skin color, texture, turgor normal. No rashes or lesions     Mental Status: Alert, oriented x4, thought content appropriate; follows commands than 140  Repeat CT head  Complete echo pending  A1c 5.4, LDL 62  Hold DAPT for now   Continue high intensity statin   PT/OT/ST  Stroke education   SCDs    David Vazquez, TASHI NP-C   2:14 PM  3/29/2021

## 2021-03-29 NOTE — PLAN OF CARE
Problem: Physical Regulation:  Goal: Ability to maintain clinical measurements within normal limits will improve  Description: Ability to maintain clinical measurements within normal limits will improve  Outcome: Met This Shift  Goal: Will regain or maintain usual level of consciousness  Description: Will regain or maintain usual level of consciousness  Outcome: Met This Shift     Problem: Tissue Perfusion:  Goal: Signs of adequate cerebral perfusion will increase  Description: Signs of adequate cerebral perfusion will increase  Outcome: Met This Shift  Goal: Ability to maintain intracranial pressure will improve  Description: Ability to maintain intracranial pressure will improve  Outcome: Met This Shift     Problem: HEMODYNAMIC STATUS  Goal: Patient has stable vital signs and fluid balance  Outcome: Met This Shift     Problem: ACTIVITY INTOLERANCE/IMPAIRED MOBILITY  Goal: Mobility/activity is maintained at optimum level for patient  Outcome: Met This Shift     Problem: COMMUNICATION IMPAIRMENT  Goal: Ability to express needs and understand communication  3/28/2021 2151 by Paul Vital RN  Outcome: Met This Shift  3/28/2021 0852 by Renuka Morales RN  Outcome: Met This Shift     Problem: Skin Integrity:  Goal: Will show no infection signs and symptoms  Description: Will show no infection signs and symptoms  Outcome: Met This Shift  Goal: Absence of new skin breakdown  Description: Absence of new skin breakdown  Outcome: Met This Shift     Problem: Falls - Risk of:  Goal: Will remain free from falls  Description: Will remain free from falls  3/28/2021 2151 by Paul Vital RN  Outcome: Met This Shift  3/28/2021 0852 by Renuka Morales RN  Outcome: Met This Shift  Goal: Absence of physical injury  Description: Absence of physical injury  3/28/2021 2151 by Paul Vital RN  Outcome: Met This Shift  3/28/2021 0852 by Renuka Morales RN  Outcome: Met This Shift     Problem: ABCDS Injury Assessment  Goal: Absence of physical injury  Outcome: Met This Shift

## 2021-03-30 PROBLEM — I62.9 INTRACRANIAL HEMORRHAGE (HCC): Status: ACTIVE | Noted: 2021-01-01

## 2021-03-30 NOTE — PROGRESS NOTES
Message sent to neurology to inquire about additional bp medications since they want sbp <140 and prn medications are for sbp >180.

## 2021-03-30 NOTE — PLAN OF CARE
Problem: Physical Regulation:  Goal: Ability to maintain clinical measurements within normal limits will improve  Description: Ability to maintain clinical measurements within normal limits will improve  Outcome: Met This Shift  Goal: Will regain or maintain usual level of consciousness  Description: Will regain or maintain usual level of consciousness  Outcome: Met This Shift     Problem: Tissue Perfusion:  Goal: Signs of adequate cerebral perfusion will increase  Description: Signs of adequate cerebral perfusion will increase  Outcome: Met This Shift  Goal: Ability to maintain intracranial pressure will improve  Description: Ability to maintain intracranial pressure will improve  Outcome: Met This Shift     Problem: HEMODYNAMIC STATUS  Goal: Patient has stable vital signs and fluid balance  Outcome: Met This Shift     Problem: ACTIVITY INTOLERANCE/IMPAIRED MOBILITY  Goal: Mobility/activity is maintained at optimum level for patient  Outcome: Met This Shift     Problem: COMMUNICATION IMPAIRMENT  Goal: Ability to express needs and understand communication  Outcome: Met This Shift     Problem: Skin Integrity:  Goal: Will show no infection signs and symptoms  Description: Will show no infection signs and symptoms  Outcome: Met This Shift  Goal: Absence of new skin breakdown  Description: Absence of new skin breakdown  Outcome: Met This Shift     Problem: Falls - Risk of:  Goal: Will remain free from falls  Description: Will remain free from falls  Outcome: Met This Shift  Goal: Absence of physical injury  Description: Absence of physical injury  Outcome: Met This Shift     Problem: ABCDS Injury Assessment  Goal: Absence of physical injury  Outcome: Met This Shift     Problem: Neurological  Goal: Maximum potential motor/sensory/cognitive function  Outcome: Met This Shift

## 2021-03-30 NOTE — PLAN OF CARE
Problem: Physical Regulation:  Goal: Ability to maintain clinical measurements within normal limits will improve  Description: Ability to maintain clinical measurements within normal limits will improve  3/30/2021 1519 by Jony Bazan RN  Outcome: Met This Shift  3/30/2021 0122 by Karma Perez  Outcome: Met This Shift  Goal: Will regain or maintain usual level of consciousness  Description: Will regain or maintain usual level of consciousness  3/30/2021 1519 by Jony Bazan RN  Outcome: Met This Shift  3/30/2021 0122 by Karma Perez  Outcome: Met This Shift     Problem: Tissue Perfusion:  Goal: Signs of adequate cerebral perfusion will increase  Description: Signs of adequate cerebral perfusion will increase  3/30/2021 1519 by Jony Bazan RN  Outcome: Met This Shift  3/30/2021 0122 by Karma Perez  Outcome: Met This Shift  Goal: Ability to maintain intracranial pressure will improve  Description: Ability to maintain intracranial pressure will improve  3/30/2021 1519 by Jony Bazan RN  Outcome: Met This Shift  3/30/2021 0122 by Karma Perez  Outcome: Met This Shift     Problem: HEMODYNAMIC STATUS  Goal: Patient has stable vital signs and fluid balance  3/30/2021 1519 by Jony Bazan RN  Outcome: Met This Shift  3/30/2021 0122 by Karma Perez  Outcome: Met This Shift     Problem: ACTIVITY INTOLERANCE/IMPAIRED MOBILITY  Goal: Mobility/activity is maintained at optimum level for patient  3/30/2021 1519 by Jony Bazan RN  Outcome: Met This Shift  3/30/2021 0122 by Karma Perez  Outcome: Met This Shift     Problem: COMMUNICATION IMPAIRMENT  Goal: Ability to express needs and understand communication  3/30/2021 1519 by Jony Bazan RN  Outcome: Met This Shift  3/30/2021 0122 by Karma Perez  Outcome: Met This Shift     Problem: Skin Integrity:  Goal: Will show no infection signs and symptoms  Description: Will show no infection signs and symptoms  3/30/2021 1519 by

## 2021-03-30 NOTE — PROGRESS NOTES
Active Problems:    Essential hypertension    Paroxysmal atrial fibrillation (HCC)    Hyponatremia    Intracranial hemorrhage (HCC)  Resolved Problems:    * No resolved hospital problems. *       I have personally reviewed the CT head from yesterday, showing cerebellar hemorrhage. Blood thinners have been held. Discussed with neurology. They think we can do without neurosurgery consult for now. They recommend serial CT scans until antiplatelets have worn off. She does not seem to show any significant symptoms from the bleed.     Continue statin    Blood pressure overall well controlled, 130-150    Glucose well controlled    Hyponatremia resolved    Continue metoprolol; keep off AC    Requires continued inpatient level of care     Gretchen Govea    12:53 PM  3/30/2021

## 2021-03-31 NOTE — PROGRESS NOTES
Assist   To comb hair  Mod A  Simple grooming tasks, washing off face Min A using L hand, R hand reaching top of head but unable to reach middle or back  Minimal Assist    UB Dressing Maximal Assist  Max A  Doff/donning gown seated at EOB Moderate Assist    LB Dressing Dependent   To don/doff socks  Dep  [place] socks bed level Maximal Assist    Bathing Maximal Assist  simulated Max A  Simulated task   Moderate Assist    Toileting Dependent   Dep Maximal Assist    Bed Mobility  Rolling: Max A  Supine to sit: Maximal Assist   Sit to supine: Maximal Assist   Max A  Supine < > sit  Supine to sit: Minimal Assist   Sit to supine: Minimal Assist    Functional Transfers Sit to stand: Maximal Assist ( to partially clear surface of bed)  Stand to sit: Maximal Assist   Stand pivot: NT   Max A   Sit < > stand completed x 2  With mod cues for posture, hand placement & technique Moderate Assist    Functional Mobility NT NT  TBD   Balance Sitting:     Static:  Min A    Dynamic:mod A  Standing: max A  Sitting:     Static:  Min A    Dynamic: Mod A  Standing: Max A Sitting:     Static:  supervision    Dynamic:min A  Standing: mod A   Activity Tolerance poor  Fair-  Seated at EOB 15-18 minutes fair   Visual/  Perceptual Glasses: yes  Perception: NT             BUE  ROM/Strength/  Fine motor Coordination Hand dominance: R     RUE: ROM shoulder flex approx 45 degree, WFL distally     Strength: grossly 3-/5      Strength: poor+     Coordination:  poor     LUE: ROM shoulder flex limited to approx 45  WFL distally     Strength: grossly 3-/5      Strength: poor+     Coordination: poor        Education:  Pt was educated through out treatment regarding proper technique & safety with bed mobility, sitting & standing balance/tolerance with focus on upright posture, functional transfers & completion of simple ADL tasks to ease tasks to improve safety & prevent falls. Comments: Upon arrival pt was in bed & agreeable for therapy.  At end of session pt was returned to bed, HOB upright, B UE elevated with pillows & B heels off loaded, all lines and tubes intact, call light within reach. · Pt has made Fair progress towards set goals. · Continue with current plan of care  ·   Treatment Time In: 11:10           Treatment Time Out: 11:40              Treatment Charges: Mins Units   Ther Ex  98530     Manual Therapy [phone]     Thera Activities 00637 15 1   ADL/Home Mgt 68040 15 1   Neuro Re-ed 92228     Group Therapy      Orthotic manage/training  90741     Non-Billable Time     Total Timed Treatment 30 2       Shaila JYvette  [address] [place] [address], [address] [place] [address]

## 2021-03-31 NOTE — PROGRESS NOTES
Alert, oriented x4, thought content appropriate; follows commands well    Speech:  Moderate dysarthria   Language: No aphasia     Cranial Nerves:  I: smell    II: visual acuity     II: visual fields Bilateral threat    II: pupils PERRL   III,VII: ptosis None   III,IV,VI: extraocular muscles  Full ROM   V: mastication Normal   V: facial light touch sensation  Normal   V,VII: corneal reflex     VII: facial muscle function - upper  Normal   VII: facial muscle function - lower L facial droop    VIII: hearing [place]    IX: soft palate elevation  Normal   IX,X: gag reflex    XI: trapezius strength  5/5   XI: sternocleidomastoid strength 5/5   XI: neck extension strength  5/5   XII: tongue strength  Normal     Motor:  5/5 RUE  4/5 LUE  4/5 BLE  Normal tone and bulk   No abnormal movements     Sensory:  LT normal in all extremities     Coordination:   FFM, FNF impaired on L, intact to right    No Babinskis    Laboratory/Radiology:     CBC with Differential:    Lab Results   Component Value Date    WBC 9.3 03/31/2021    RBC 3.81 03/31/2021    HGB 11.4 03/31/2021    HCT 35.0 03/31/2021     03/31/2021    MCV 91.9 03/31/2021    MCH 29.9 03/31/2021    MCHC 32.6 03/31/2021    RDW 13.9 03/31/2021    NRBC 0.0 08/06/2020    LYMPHOPCT 10.3 03/31/2021    LYMPHOPCT 18.2 08/06/2020    MONOPCT 9.3 03/31/2021    BASOPCT 0.4 03/31/2021    MONOSABS 0.86 03/31/2021    LYMPHSABS 0.96 03/31/2021    EOSABS 0.10 03/31/2021    BASOSABS 0.04 03/31/2021     CMP:    Lab Results   Component Value Date     03/31/2021    K 4.4 03/31/2021    K 2.9 03/26/2021    CL 94 03/31/2021    CO2 30 03/31/2021    BUN 31 03/31/2021    CREATININE 1.0 03/31/2021    GFRAA >60 03/31/2021    LABGLOM 51 03/31/2021    LABGLOM 45 03/24/2021    GLUCOSE 101 03/31/2021    GLUCOSE 85 03/24/2021    PROT 6.4 03/28/2021    LABALBU 3.7 03/28/2021    LABALBU 3.5 08/06/2020    CALCIUM 8.8 03/31/2021    BILITOT 0.8 03/28/2021    BILITOT Negative 11/19/2020    ALKPHOS 108 03/28/2021    AST 18 03/28/2021    ALT 11 03/28/2021     HgBA1c:    Lab Results   Component Value Date    LABA1C 5.4 03/28/2021     CT HEAD WO CONTRAST   Final Result   Again seen is left cerebellar hemorrhagic infarct. Degree of hemorrhage is   mild but increased as compared to CT of 03/27/2021. XR CHEST PORTABLE   Final Result   No evidence of acute cardiopulmonary disease. 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 MRI BRAIN WO CONTRAST   Final Result   1. Subacute hemorrhagic infarct in the left cerebellar hemisphere, with   surrounding areas of edema and mild mass effect on the 4th ventricle. Follow-up head CT may be of benefit for further evaluation. 2. Chronic infarcts are noted in the basal ganglia and right cerebellar   hemisphere. 3. Cerebral parenchymal volume loss with chronic microvascular white matter   ischemic disease. CT HEAD WO CONTRAST   Final Result   Low-attenuation changes in the left medial superior cerebellum consistent   with evolution of the patient's known ischemic changes. No evidence for   intracranial hemorrhage. CTA head/neck  1. Ischemic penumbra in the left superior cerebellum and the medial occipital  lobe within the superior cerebellar and posterior cerebral artery territories. 2. Left superior cerebellar artery occlusion. 3. Extensive intracranial atherosclerosis with multifocal severe stenosis of  distal cerebral artery branches, including the left posterior cerebral artery. 4. Bilateral vertebral artery multifocal severe stenosis. 5. Right internal carotid artery 60% stenosis. R/P CTH  Low-attenuation changes in the left medial superior cerebellum consistent  with evolution of the patient's known ischemic changes.  No evidence for  intracranial hemorrhage.     I personally reviewed all labs and images today     Assessment:     L cerebellar stroke    Mechanism HARRY embolus from

## 2021-03-31 NOTE — PROGRESS NOTES
BILITOT 0.8 03/28/2021    BILITOT Negative 11/19/2020    ALKPHOS 108 03/28/2021    AST 18 03/28/2021    ALT 11 03/28/2021        Assessment/Plan:  Principal Problem:    Acute CVA (cerebrovascular accident) [facility])  Active Problems:    Essential hypertension    Paroxysmal atrial fibrillation (HCC)    Hyponatremia    Intracranial hemorrhage (HCC)  Resolved Problems:    * No resolved hospital problems. *       IV BP meds PRN SBP > 140    Add lisinopril    Continue statin    Glucose well controlled    Hyponatremia slightly worse today at 131, continue to monitor. Liberalize diet as noted above. Patient and daughter understand risks of aspiration.   I had her swallow some water at bedside and she did reasonably well with slight coughing on 1 out of 4 attempts    Continue [facility]rolol; [facility] [place]    Requires continued inpatient level of care     Paul Ewing    8:16 AM  3/31/2021

## 2021-03-31 NOTE — PROGRESS NOTES
Physical Therapy  Physical Therapy    Name: Mis Cobb  : 1924  MRN: [id number]    Referring Provider:  TASHI Cote    Date of Service: 3/31/2021    Evaluating PT:  Swetha Dub, PT, DPT [id number]    Room #:  5181/2316-G  Diagnosis:  Acute CVA  Precautions: Falls, bed alarm, O2, [place], dysarthria, mild RLE weakness  Procedure/Surgery:  3/26 tPA  PMHx/PSHx:  CHF, CVA, HLD, HTN  Equipment Needs:  TBD    SUBJECTIVE:    Pt was admitted from SNF/FDC. Pt was wheelchair level using BLEs to propel chair. Pt required assistance with ADLs PTA. OBJECTIVE:   Initial Evaluation  Date: 3/29/21 Treatment 3/31/21 Short Term/ Long Term   Goals   AM-PAC 6 Clicks  91/85    Was pt agreeable to Eval/treatment? Yes yes    Does pt have pain? No c/o pain No c/o pain    Bed Mobility  Rolling: MaxA  Supine to sit: MaxA  Sit to supine: MaxA  Scooting: MaxA Rolling MaxA  Supine to sit MaxA  Sit to supine MaxA  Scooting ModA [place]   Transfers Sit to stand: MaxA  Stand to sit: MaxA  Stand pivot: NT Sit ot stand MaxA  Stand to sit MaxA  [place] with AAD   Ambulation   NT N/T unable to advance B LEs >15 feet with [place] with AAD   Wheelchair Mobility  NT  >50 feet with [place] on level surfaces   ROM BUE:  Defer to OT note  BLE:  WFL     Strength BUE:  Defer to OT note  LLE:  4/5  RLE:  4-/5  Increase by 1/3 MMT grade   Balance Sitting EOB:  [place] dynamic  Dynamic Standing:  MaxA with [employer] Sitting EOB [place]  Dynamic standing MaxA to ww. Sitting EOB:  Independent  Dynamic Standing:  [place] with AAD     Pt is A & O x 3  Sensation:  Impaired  Edema:  None    Therapeutic Exercises:  AAROM heel slides 2x10, AAROM hip abd 10x, LAQS 10x, ankle pumps 20x. Patient education  Pt educated on safety    Patient response to education:   Pt verbalized understanding Pt demonstrated skill Pt requires further education in this area   x x x     ASSESSMENT:    Comments:  Pt was in bed upon arrival, agreeable to tx session.  Pt cleared by nurse. Pt required MaxA for bed mobility. Pt did assist with B LEs and initiate rolling. Pt sat EOB and performed LAQS and ankle pumps. Pt stood x3 reps to ww. Pt with flexed hips, knees and head even with cues to correct. Pt unable to advance B LEs for amb. Pt returned to bed and repositioned. Pt performed LE ex in supine with assistance. Pt given call light and bed alarm applied. Treatment:  Patient practiced and was instructed in the following treatment:     Bed mobility training - pt given verbal and tactile cues to facilitate proper sequencing and safety during rolling and supine>sit as well as provided with physical assistance to complete task    Sitting EOB for >10 minutes for upright tolerance, postural awareness and BLE ROM   Transfer training - pt was given verbal and tactile cues to facilitate proper hand placement, technique and safety during sit to stand and stand to sit as well as provided with physical assistance to complete task.  Skilled positioning - Pt placed in the chair position to facilitate upright posture, joint and skin integrity, and interaction with environment. PLAN OF CARE:    Current Treatment Recommendations     [x] Strengthening     [] ROM   [x] Balance Training   [x] Endurance Training   [x] Transfer Training   [x] Gait Training   [] Stair Training   [x] Positioning   [x] Safety and Education Training   [x] Patient/Caregiver Education   [] HEP  [] Other     Frequency of treatments: 2-5x/week x 1-2 weeks.     Time in 11:20  Time out  11:45    Total Treatment Time  25 minutes       CPT codes:  [] Low Complexity PT evaluation 63278  [] Moderate Complexity PT evaluation 34030  [] High Complexity PT evaluation 86814  [] PT Re-evaluation 45038  [] Gait training 12611 - minutes  [] Manual therapy 99031 - minutes  [x] Therapeutic activities 44082 25 minutes  [] Therapeutic exercises 02974 - minutes  [] Neuromuscular reeducation 62098 - minutes    Jackie Stoner [id number]

## 2021-04-01 NOTE — DISCHARGE SUMMARY
Physician Discharge Summary     Patient ID:  Keny Mcleod  [id number]  23 y.o.  2/22/1924    Admit date: 3/26/2021    Discharge date and time:  4/1/2021     Admission Diagnoses:    Acute CVA (cerebrovascular accident) [facility])     Discharge Diagnoses:   Principal Problem:    Acute CVA (cerebrovascular accident) [facility])  Active Problems:    Essential hypertension    Paroxysmal atrial fibrillation ([facility] [address]    Hyponatremia    Intracranial hemorrhage ([facility] [address]  Resolved Problems:    * No resolved hospital problems. *       Consults: Neurology, critical care    Procedures: TPA administration    Hospital Course:   Patient presented with acute aphasia. She was given TPA. Her aphasia resolved, however she suffered a small intracranial hemorrhage. She seems basically asymptomatic in terms of the hemorrhage. All blood thinners have been held. Neurology has signed off and recommends outpatient CT scan in 2 weeks. She was severely hyponatremic on admission and likely extremely volume depleted due to multiple diuretics. Those were held and she was given fluids. Her hyponatremia has resolved. She was noted to have some dysphagia after her stroke. However, she seems to be getting progressively dehydrated with recurrence of hyponatremia due to thickened liquids. After discussion with the patient and her daughter, we opted to liberalize her fluids to thin liquids, and they understand and accept the risk of aspiration. On telemetry she was noted to occasionally have short runs of self terminating atrial fibrillation with RVR. The atrial fibrillation is a known prior issue. I have increased her metoprolol dose. Her blood pressure has been highnormal.  I have added lisinopril and increased her Norvasc. I am hesitant to push the blood pressure medications too hard because she is so elderly and could be susceptible to side effects.   Recommend close follow-up as outpatient and gently uptitrate the blood pressure medications as tolerated to keep blood pressure in the normal or near normal range. Discharge Exam:  Vitals:    03/31/21 2000 03/31/21 2345 04/01/21 0414 04/01/21 0812   BP: (!) 150/61 (!) 140/65 (!) 152/99 (!) 150/77   Pulse: 86 86 84 82   Resp: 18 18 18 18   Temp: 97.2 °F (36.2 °C) 98.1 °F (36.7 °C) 98.1 °F (36.7 °C) 98.4 °F (36.9 °C)   TempSrc: Temporal Temporal Temporal Temporal   SpO2: 96% 98%  97%   Weight:       Height:            Gen: Super pleasant elderly female in NAD  CV: RRR no m/r/g  Lungs: CTAB  Abd: +BS soft NT ND no R/G  Ext: No LE edema  Neuro: Very slight left facial droop (subtle). Handgrips 5/5 b/l. Moves feet bilaterally, although effort low. Condition:  Stable    Disposition: SNF    Patient Instructions:   Current Discharge Medication List      START taking these medications    Details   atorvastatin (LIPITOR) 40 MG tablet Take 1 tablet by mouth nightly  Qty: 30 tablet, Refills: 3      lisinopril (PRINIVIL;ZESTRIL) 10 MG tablet Take 1 tablet by mouth daily  Qty: 30 tablet, Refills: 3      sodium chloride 1 g tablet Take 1 tablet by mouth 3 times daily (with meals)  Qty: 90 tablet, Refills: 3         CONTINUE these medications which have CHANGED    Details   metoprolol tartrate (LOPRESSOR) 25 MG tablet Take 1 tablet by mouth 2 times daily  Qty: 60 tablet, Refills: 3      amLODIPine (NORVASC) 5 MG tablet Take 2 tablets by mouth daily  Qty: 31 tablet, Refills: 10         CONTINUE these medications which have NOT CHANGED    Details   potassium chloride (KLOR-CON) 10 MEQ extended release tablet Take 1 tablet by mouth 3 times daily  Qty: 270 tablet, Refills: 3      polyethylene glycol (GLYCOLAX) 17 GM/SCOOP powder DISSOLVE 17GRAM (1 CAPFUL) IN 4 TO 8OZ OF BEVERAGE AND TAKE BY MOUTH DAILY.  **RE-ORDER ACCORDINGLY**  Qty: 510 g, Refills: 11      mirabegron (MYRBETRIQ) 25 MG TB24 Take 1 tablet by mouth nightly  Qty: 30 tablet, Refills: 11      amiodarone (CORDARONE) 200 MG tablet TAKE ONE(1) TABLET BY MOUTH ONCE DAILY  Qty: 31 tablet, Refills: 10      anastrozole (ARIMIDEX) 1 MG tablet TAKE ONE TABLET BY MOUTH DAILY **WOMEN OF CHILD-BEARING AGE SHOULD NOT HANDLE BROKEN OR CRUSHED TABLETS**  Qty: 31 tablet, Refills: 11      menthol-zinc oxide (CALMOSEPTINE) 0.44-20.6 % OINT ointment Apply 1 each topically 2 times daily as needed Max 30 ml per day.       Dentifrices (BIOTENE DRY MOUTH CARE DT) Place 1 Container onto teeth 4 times daily as needed      bisacodyl (DULCOLAX) 10 MG suppository Place 10 mg rectally daily as needed for Constipation      senna-docusate (SENEXON-S) 8.6-50 MG per tablet Take 1 tablet by mouth 2 times daily      loratadine (CLARITIN) 10 MG tablet Take 10 mg by mouth daily as needed (allergy sxms)      simethicone (MI-ACID GAS RELIEF) 80 MG chewable tablet Take 80 mg by mouth every 8 hours as needed for Flatulence      carboxymethylcellulose PF (REFRESH PLUS) 0.5 % SOLN ophthalmic solution 1 drop 3 times daily      polyvinyl alcohol (ARTIFICIAL TEARS) 1.4 % ophthalmic solution Place 1 drop into both eyes 3 times daily as needed for Dry Eyes      Artificial Tear Ointment (REFRESH LACRI-LUBE) OINT ointment Apply 1 inch to eye nightly as needed       magnesium oxide (MAG-OX) 400 (240 MG) MG tablet take 1 tablet by mouth once daily  Qty: 30 tablet, Refills: 3      docusate sodium (COLACE) 100 MG capsule Take 200 mg by mouth daily       calcium citrate-vitamin D (CITRICAL + D) 315-250 MG-UNIT TABS per tablet Take 2 tablets by mouth daily       magnesium hydroxide (MILK OF MAGNESIA) 400 MG/5ML suspension Take 30 mLs by mouth daily as needed for Constipation      acetaminophen (TYLENOL) 325 MG tablet Take 650 mg by mouth every 4 hours as needed for Pain or Fever       loperamide (IMODIUM) 2 MG capsule Take 2 mg by mouth 4 times daily as needed for Diarrhea         STOP taking these medications       metOLazone (ZAROXOLYN) 2.5 MG tablet Comments:   Reason for Stopping:         furosemide (LASIX) 40 MG tablet Comments:   Reason for Stopping:         traMADol (ULTRAM) 50 MG tablet Comments:   Reason for Stopping:         clopidogrel (PLAVIX) 75 MG tablet Comments:   Reason for Stopping:         simvastatin (ZOCOR) 20 MG tablet Comments:   Reason for Stopping:         losartan (COZAAR) 100 MG tablet Comments:   Reason for Stopping:         aspirin 81 MG tablet Comments:   Reason for Stopping:             Activity: activity as tolerated  Diet: Soft, bite sized diet with aspiration precautions. Patient declined recommended thickened liquid diet so she is on thin liquids    Follow-up with PCP in 1 week.     Note that over 30 minutes was spent in preparing discharge papers, discussing discharge with patient, medication review, etc.    Signed:  Bailee Mckeon    4/1/2021  11:10 AM

## 2021-04-01 NOTE — PLAN OF CARE
Patient transferring to [place] [address]    Neurology was following for a left cerebellar stroke    With marked intracranial  as well as a history of afib.  Previously not taking anticoagulation due to her hx of a SDH    Recent CT demonstrated an area of hemorrhagic conversion and DAPT/OAC held as a result    Plan for stroke clinic follow up to discuss risk/benefits of therapies given her age and risk for recurrent events     Plan for repeat CT Head in 2 weeks -- order placed at this time     Continue high dose statin

## 2021-04-01 NOTE — CARE COORDINATION
SW received message from Chris Juares at [facility], they received auth for patient. Have sent message to Dr Regis Ayala regarding possible discharge.

## 2021-04-01 NOTE — DISCHARGE INSTR - COC
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    Essential hypertension I10    Hypokalemia E87.6    Paroxysmal atrial fibrillation (HCC) I48.0    Vitamin D insufficiency E55.9    Valvular heart disease I38    History of moreno hole surgery 6/16/15 Z98.890    Malignant neoplasm of left female breast ([facility] [address] C50.912    Incontinence in female R32    TIA involving right internal carotid artery G45.1    OAB (overactive bladder) N32.81    Other constipation K59.09    Unable to walk R26.2    Senile debility R54    Primary osteoarthritis involving multiple joints M89.49    Chronic congestive heart failure (HCC) I50.9    Acute CVA (cerebrovascular accident) ([facility] [address] M87.9    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    Atrial fibrillation (HCC) I48.91    Leukocytosis D72.829    Hyponatremia E87.1    History of breast cancer Z85.3    Received intravenous tissue plasminogen activator (tPA) in emergency department Z92.82    Intracranial hemorrhage (HCC) I62.9       Isolation/Infection:   Isolation            No Isolation          Patient Infection Status       Infection Onset Added Last Indicated Last Indicated By Review Planned Expiration Resolved Resolved By    None active    Resolved    COVID-19 Rule Out 03/26/21 03/26/21 03/26/21 COVID-19, Rapid (Ordered)   03/26/21 Rule-Out Test Resulted            Nurse Assessment:  Last Vital Signs: BP (!) 150/77   Pulse 82   Temp 98.4 °F (36.9 °C) (Temporal)   Resp 18   Ht 5' 3\" (1.6 m)   Wt 142 lb 6.7 oz (64.6 kg)   LMP  (LMP Unknown)   SpO2 97%   BMI 25.23 kg/m²     Last documented pain score (0-10 scale): Pain Level: 0  Last Weight:   Wt Readings from Last 1 Encounters:   03/28/21 142 lb 6.7 oz (64.6 kg)     Mental Status:  oriented and alert    IV Access:  - None    Nursing Mobility/ADLs:  Walking   Dependent  Transfer  Assisted  Bathing  Assisted  Dressing  Assisted  Toileting  Assisted  Feeding  Assisted  Med Admin  Assisted  Med Delivery   crushed    Wound Care Documentation and Therapy:  Wound 04/07/15 Abrasion(s) Right (Active)   Number of days: 2185       Incision 06/18/15 Head Lateral;Right; Anterior (Active)   Number of days: 2114        Elimination:  Continence:   · Bowel: No  · Bladder: No  Urinary Catheter: None   Colostomy/Ileostomy/Ileal Conduit: No       Date of Last BM: 4/1/21    Intake/Output Summary (Last 24 hours) at 4/1/2021 1112  Last data filed at 4/1/2021 0812  Gross per 24 hour   Intake 240 ml   Output 200 ml   Net 40 ml     I/O last 3 completed shifts: In: 120 [P.O.:120]  Out: 200 [Urine:200]    Safety Concerns: At Risk for Falls    Impairments/Disabilities:      Speech    Nutrition Therapy:  Current Nutrition Therapy:   - Oral Diet:  General and Dental Soft    Routes of Feeding: Oral  Liquids: Thin Liquids  Daily Fluid Restriction: no  Last Modified Barium Swallow with Video (Video Swallowing Test): not done    Treatments at the Time of Hospital Discharge:   Respiratory Treatments: ***  Oxygen Therapy:  is not on home oxygen therapy.   Ventilator:    - No ventilator support    Rehab Therapies: Physical Therapy and Occupational Therapy  Weight Bearing Status/Restrictions: No weight bearing restirctions  Other Medical Equipment (for information only, NOT a DME order):  {EQUIPMENT:[id number]}  Other Treatments: ***    Patient's personal belongings (please select all that are sent with patient):  Glasses, Dentures upper and lower    RN SIGNATURE:  Electronically signed by Alicia Edge RN on 4/1/21 at 3:26 PM EDT    CASE MANAGEMENT/SOCIAL WORK SECTION    Inpatient Status Date: ***    Readmission Risk Assessment Score:  Readmission Risk              Risk of Unplanned Readmission:        23           Discharging to Facility/ Agency   · Name:   · Address:  · Phone:  · Fax:    Dialysis Facility (if applicable)   · Name:  · Address:  · Dialysis Schedule:  · Phone:  · Fax:    / signature: {Esignature:[id number]:::0}    PHYSICIAN SECTION    Prognosis: Good    Condition at Discharge: Stable    Rehab Potential (if transferring to Rehab): Good    Recommended Labs or Other Treatments After Discharge: CBC, BMP in 1 week. Make sure she gets to her follow up CT scan in 2 weeks, and Neurology follow up afterwards (timing TBD by Neurology)    Physician Certification: I certify the above information and transfer of Viraj Short  is necessary for the continuing treatment of the diagnosis listed and that she requires [place] for less 30 days.      Update Admission H&P: No change in H&P    PHYSICIAN SIGNATURE:  Electronically signed by Marcelino Gutierrez MD on 4/1/21 at 11:13 AM EDT

## 2021-04-01 NOTE — CARE COORDINATION
DR. Jacobson called.   Transportation arranged for 4:30p via [employer]. Have attempted to reach daughter, Chalo You, left message. All discharge paperwork on soft chart.

## 2021-05-18 NOTE — PROGRESS NOTES
5/18/2021    Suman Mcleod [place]  2/22/1924    This resident is being seen today for an acute evaluation visit. 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 She is a 80 y.o. female resident who is being seen today for an acute evaluation per the request of staff secondary to ongoing decline with resident currently on hospice services. Staffing does indicate that the resident has not been eating or drinking and has been refusing all medication. They had spoken with her daughter yesterday and daughter did not want to make the decision to discontinue medications. It is of note to mention, that this is a resident that had presented to the emergency room setting due to strokelike symptoms and she was given the benefit of TPA and it was felt that subsequent to this that the resident just gave up. Resident currently at this time essentially nonresponsive/nonverbal.  Resident attempted to follow no acute commands and did not open eyes throughout evaluation. She appeared to be comfortable and without distress. There was no evidence of any respiratory difficulty such as coughing or shortness of breath and her bowels have been controlled in terms of no constipation or diarrhea. She furthermore has had no fever chills recent falls or syncopal events.       Medications:  Biotene dry mouth 15 mL every 6 hours as needed  Bisacodyl 10 mg suppository daily as needed  Milk of magnesia 30 mL daily as needed  Calmoseptine ointment to coccyx twice daily  Morphine sulfate 0.25 mL every 4 hours as needed  Atropine sulfate 1% 2 drops orally every 2 hours as needed Objective     Vital Signs: /64 pulse 70 respirations 16 temperature 97.9 O2 96% weight 135.4 pounds      Physical examination:Skin is essentially warm and dry. She does have a dressing intact to the lower extremity being monitored accordingly per staffing. HEENT membranes do appear to be dry but otherwise unremarkable. Neck is supple. Heart irregularly irregular. She does have evidence of a murmur consistent with grade 2/6. No rubs or gallops noted. Lungs are diminished throughout but clear to auscultation. No evidence of rhonchi, rales, or wheezing. Abdomen is soft, supple and non-tender. Bowel sounds are noted x4 quadrants. No rigidity, guarding or rebound tenderness. Negative Torre's, negative McBurney's, negative Cristobal's. Extremities; no true pitting edema. Pulses are adequate. No clubbing  or no cyanosis noted. Neurologically she nonresponsive and unable to follow commands. No evidence of paralysis or paresthesias noted. Diagnoses and all orders for this visit:    Declining functional status  Comments:  Recommendations given for palliative care with discontinuation of all oral medication and maintain pleasure feeds    Loss of appetite  Comments:  Resident refusing to eat or drink and does maintain the benefit of palliative care    Hospice care  Comments:  Maintain hospice services due to functional decline with history of intracranial hemorrhage    Acute CVA (cerebrovascular accident) [facility])  Comments:  Medications discontinued due to inability to swallow    Chronic atrial fibrillation [facility])  Comments:  Maintain hospice with medications discontinued other than palliative      Plan: Plan of care was discussed with healthcare team with meds and labs reviewed. I did ask staff to furthermore discontinue all oral medications, outside of those recommended for comfort care. Resident again is unable to take medications and has not been eating or drinking.   Her family is aware and her daughter did come in to visit with her today. She will maintain the benefit of a Calvin lift for all transfers and maintain hospice care in terms of palliative care measures. I will plan to see her routinely and as needed with further orders forthcoming. FELI VILLA, APRN - CNP      *Note was creating using voice recognition software.   The document was reviewed however grammatical errors may exist.